# Patient Record
Sex: FEMALE | Race: WHITE | NOT HISPANIC OR LATINO
[De-identification: names, ages, dates, MRNs, and addresses within clinical notes are randomized per-mention and may not be internally consistent; named-entity substitution may affect disease eponyms.]

---

## 2017-01-05 ENCOUNTER — APPOINTMENT (OUTPATIENT)
Dept: OPHTHALMOLOGY | Facility: CLINIC | Age: 37
End: 2017-01-05

## 2017-01-09 ENCOUNTER — APPOINTMENT (OUTPATIENT)
Dept: OPHTHALMOLOGY | Facility: HOSPITAL | Age: 37
End: 2017-01-09

## 2017-01-09 ENCOUNTER — APPOINTMENT (OUTPATIENT)
Dept: PLASTIC SURGERY | Facility: HOSPITAL | Age: 37
End: 2017-01-09

## 2017-01-09 NOTE — ASU DISCHARGE PLAN (ADULT/PEDIATRIC). - SPECIAL INSTRUCTIONS
Use ofloxacin and prednisolone eye drops 3 times today.  Please call [phone] if you have any questions or if you have pain despite taking Tylenol (after 4:30 PM-9am).  Please call [phone] or [phone] during the day (9am-4:30PM) if you have any questions or concerns or pain despite taking Tylenol. Please Use ofloxacin and prednisolone eye drops 3 times today.  Please call [phone] if you have any questions or if you have pain despite taking Tylenol (after 4:30 PM-9am).  Please call [phone] or [phone] during the day (9am-4:30PM) if you have any questions or concerns or pain despite taking Tylenol.

## 2017-01-09 NOTE — ASU DISCHARGE PLAN (ADULT/PEDIATRIC). - NOTIFY
Bleeding that does not stop/Persistent Nausea and Vomiting/Fever greater than 101/Swelling that continues/Pain not relieved by Medications

## 2017-01-09 NOTE — ASU DISCHARGE PLAN (ADULT/PEDIATRIC). - PT EDUC
Implant card (specify)/other (specify)/Intraocular lens implant (IOL) , Eye shield instructions and eye kit given to patient

## 2017-01-10 ENCOUNTER — APPOINTMENT (OUTPATIENT)
Dept: OPHTHALMOLOGY | Facility: CLINIC | Age: 37
End: 2017-01-10

## 2017-01-17 ENCOUNTER — APPOINTMENT (OUTPATIENT)
Dept: OPHTHALMOLOGY | Facility: CLINIC | Age: 37
End: 2017-01-17

## 2017-01-17 ENCOUNTER — APPOINTMENT (OUTPATIENT)
Dept: PLASTIC SURGERY | Facility: HOSPITAL | Age: 37
End: 2017-01-17

## 2017-01-17 ENCOUNTER — OUTPATIENT (OUTPATIENT)
Dept: OUTPATIENT SERVICES | Facility: HOSPITAL | Age: 37
LOS: 1 days | End: 2017-01-17

## 2017-01-17 VITALS
HEART RATE: 67 BPM | BODY MASS INDEX: 24.18 KG/M2 | WEIGHT: 138.19 LBS | DIASTOLIC BLOOD PRESSURE: 64 MMHG | SYSTOLIC BLOOD PRESSURE: 106 MMHG | HEIGHT: 63.5 IN

## 2017-01-17 DIAGNOSIS — Z98.89 OTHER SPECIFIED POSTPROCEDURAL STATES: Chronic | ICD-10-CM

## 2017-01-17 DIAGNOSIS — N63 UNSPECIFIED LUMP IN BREAST: Chronic | ICD-10-CM

## 2017-01-17 DIAGNOSIS — Z87.39 PERSONAL HISTORY OF OTHER DISEASES OF THE MUSCULOSKELETAL SYSTEM AND CONNECTIVE TISSUE: Chronic | ICD-10-CM

## 2017-01-17 DIAGNOSIS — Z90.49 ACQUIRED ABSENCE OF OTHER SPECIFIED PARTS OF DIGESTIVE TRACT: Chronic | ICD-10-CM

## 2017-01-17 DIAGNOSIS — R92.1 MAMMOGRAPHIC CALCIFICATION FOUND ON DIAGNOSTIC IMAGING OF BREAST: Chronic | ICD-10-CM

## 2017-01-18 DIAGNOSIS — N64.89 OTHER SPECIFIED DISORDERS OF BREAST: ICD-10-CM

## 2017-01-18 DIAGNOSIS — Z98.890 OTHER SPECIFIED POSTPROCEDURAL STATES: ICD-10-CM

## 2017-01-19 RX ORDER — KETOROLAC TROMETHAMINE 4 MG/ML
0.4 SOLUTION/ DROPS OPHTHALMIC
Qty: 1 | Refills: 5 | Status: ACTIVE | COMMUNITY
Start: 2017-01-19 | End: 1900-01-01

## 2017-01-24 ENCOUNTER — RX RENEWAL (OUTPATIENT)
Age: 37
End: 2017-01-24

## 2017-01-24 ENCOUNTER — APPOINTMENT (OUTPATIENT)
Dept: OPHTHALMOLOGY | Facility: CLINIC | Age: 37
End: 2017-01-24

## 2017-01-27 ENCOUNTER — APPOINTMENT (OUTPATIENT)
Dept: OPHTHALMOLOGY | Facility: CLINIC | Age: 37
End: 2017-01-27

## 2017-01-27 NOTE — ASU PATIENT PROFILE, ADULT - PSH
Breast calcification, right  breast biopsy  Breast lump in female  left breast lumpectomy  craniotomy  3/2006 placement of EEG leads  craniotomy- removal right temporal lobe  4/2006  gastric bypass  2011 lost 100 pounds  H/O bilateral mastectomy    History of TMJ disorder  Arthroscopic surgery right side  S/P breast reconstruction, bilateral    S/P cholecystectomy  3/2014  Status post cataract extraction  left eye done on 12/19/2016  Status post VNS (vagus nerve stimulator) placement  implanted, and removed 2006

## 2017-01-27 NOTE — ASU PATIENT PROFILE, ADULT - PMH
Arthralgia of temporomandibular joint    Benign breast cyst in female    Bradycardia  pt currently taking propranolol for chronic headaches  Cervical dysplasia  s/p ablation  Chronic headaches  treated with propanolol  EP (epilepsy)  last seizure in 2012  Obesity, morbid

## 2017-01-30 ENCOUNTER — OUTPATIENT (OUTPATIENT)
Dept: OUTPATIENT SERVICES | Facility: HOSPITAL | Age: 37
LOS: 1 days | End: 2017-01-30
Payer: MEDICARE

## 2017-01-30 ENCOUNTER — APPOINTMENT (OUTPATIENT)
Dept: OPHTHALMOLOGY | Facility: HOSPITAL | Age: 37
End: 2017-01-30

## 2017-01-30 ENCOUNTER — TRANSCRIPTION ENCOUNTER (OUTPATIENT)
Age: 37
End: 2017-01-30

## 2017-01-30 VITALS
SYSTOLIC BLOOD PRESSURE: 101 MMHG | HEART RATE: 42 BPM | OXYGEN SATURATION: 100 % | DIASTOLIC BLOOD PRESSURE: 53 MMHG | RESPIRATION RATE: 20 BRPM

## 2017-01-30 VITALS
WEIGHT: 135.58 LBS | SYSTOLIC BLOOD PRESSURE: 101 MMHG | RESPIRATION RATE: 16 BRPM | HEART RATE: 45 BPM | HEIGHT: 63.5 IN | TEMPERATURE: 98 F | OXYGEN SATURATION: 99 % | DIASTOLIC BLOOD PRESSURE: 61 MMHG

## 2017-01-30 DIAGNOSIS — Z90.49 ACQUIRED ABSENCE OF OTHER SPECIFIED PARTS OF DIGESTIVE TRACT: Chronic | ICD-10-CM

## 2017-01-30 DIAGNOSIS — R92.1 MAMMOGRAPHIC CALCIFICATION FOUND ON DIAGNOSTIC IMAGING OF BREAST: Chronic | ICD-10-CM

## 2017-01-30 DIAGNOSIS — Z98.49 CATARACT EXTRACTION STATUS, UNSPECIFIED EYE: Chronic | ICD-10-CM

## 2017-01-30 DIAGNOSIS — H25.811 COMBINED FORMS OF AGE-RELATED CATARACT, RIGHT EYE: ICD-10-CM

## 2017-01-30 DIAGNOSIS — N63 UNSPECIFIED LUMP IN BREAST: Chronic | ICD-10-CM

## 2017-01-30 DIAGNOSIS — Z98.89 OTHER SPECIFIED POSTPROCEDURAL STATES: Chronic | ICD-10-CM

## 2017-01-30 DIAGNOSIS — Z87.39 PERSONAL HISTORY OF OTHER DISEASES OF THE MUSCULOSKELETAL SYSTEM AND CONNECTIVE TISSUE: Chronic | ICD-10-CM

## 2017-01-30 LAB — HCG UR QL: NEGATIVE — SIGNIFICANT CHANGE UP

## 2017-01-30 PROCEDURE — V2787: CPT

## 2017-01-30 PROCEDURE — 81025 URINE PREGNANCY TEST: CPT

## 2017-01-30 PROCEDURE — 66984 XCAPSL CTRC RMVL W/O ECP: CPT | Mod: RT

## 2017-01-31 ENCOUNTER — APPOINTMENT (OUTPATIENT)
Dept: OPHTHALMOLOGY | Facility: CLINIC | Age: 37
End: 2017-01-31

## 2017-02-07 ENCOUNTER — APPOINTMENT (OUTPATIENT)
Dept: OPHTHALMOLOGY | Facility: CLINIC | Age: 37
End: 2017-02-07

## 2017-02-13 ENCOUNTER — OUTPATIENT (OUTPATIENT)
Dept: OUTPATIENT SERVICES | Facility: HOSPITAL | Age: 37
LOS: 1 days | End: 2017-02-13

## 2017-02-13 ENCOUNTER — APPOINTMENT (OUTPATIENT)
Dept: PLASTIC SURGERY | Facility: HOSPITAL | Age: 37
End: 2017-02-13

## 2017-02-13 VITALS — SYSTOLIC BLOOD PRESSURE: 110 MMHG | HEART RATE: 78 BPM | DIASTOLIC BLOOD PRESSURE: 75 MMHG

## 2017-02-13 VITALS — WEIGHT: 138 LBS | HEIGHT: 63.5 IN | BODY MASS INDEX: 24.15 KG/M2

## 2017-02-13 DIAGNOSIS — Z86.69 PERSONAL HISTORY OF OTHER DISEASES OF THE NERVOUS SYSTEM AND SENSE ORGANS: ICD-10-CM

## 2017-02-13 DIAGNOSIS — R92.1 MAMMOGRAPHIC CALCIFICATION FOUND ON DIAGNOSTIC IMAGING OF BREAST: Chronic | ICD-10-CM

## 2017-02-13 DIAGNOSIS — Z87.39 PERSONAL HISTORY OF OTHER DISEASES OF THE MUSCULOSKELETAL SYSTEM AND CONNECTIVE TISSUE: Chronic | ICD-10-CM

## 2017-02-13 DIAGNOSIS — Z90.49 ACQUIRED ABSENCE OF OTHER SPECIFIED PARTS OF DIGESTIVE TRACT: Chronic | ICD-10-CM

## 2017-02-13 DIAGNOSIS — N63 UNSPECIFIED LUMP IN BREAST: Chronic | ICD-10-CM

## 2017-02-13 DIAGNOSIS — Z98.89 OTHER SPECIFIED POSTPROCEDURAL STATES: Chronic | ICD-10-CM

## 2017-02-13 DIAGNOSIS — Z98.49 CATARACT EXTRACTION STATUS, UNSPECIFIED EYE: Chronic | ICD-10-CM

## 2017-02-17 DIAGNOSIS — Z86.69 PERSONAL HISTORY OF OTHER DISEASES OF THE NERVOUS SYSTEM AND SENSE ORGANS: ICD-10-CM

## 2017-02-17 DIAGNOSIS — N64.89 OTHER SPECIFIED DISORDERS OF BREAST: ICD-10-CM

## 2017-02-21 ENCOUNTER — APPOINTMENT (OUTPATIENT)
Dept: OPHTHALMOLOGY | Facility: CLINIC | Age: 37
End: 2017-02-21

## 2017-02-21 DIAGNOSIS — Z98.890 OTHER SPECIFIED POSTPROCEDURAL STATES: ICD-10-CM

## 2017-02-21 DIAGNOSIS — Z98.42 CATARACT EXTRACTION STATUS, LEFT EYE: ICD-10-CM

## 2017-03-02 ENCOUNTER — APPOINTMENT (OUTPATIENT)
Dept: OPHTHALMOLOGY | Facility: CLINIC | Age: 37
End: 2017-03-02

## 2017-03-02 DIAGNOSIS — H25.13 AGE-RELATED NUCLEAR CATARACT, BILATERAL: ICD-10-CM

## 2017-03-13 ENCOUNTER — APPOINTMENT (OUTPATIENT)
Dept: PLASTIC SURGERY | Facility: HOSPITAL | Age: 37
End: 2017-03-13

## 2017-03-13 ENCOUNTER — OUTPATIENT (OUTPATIENT)
Dept: OUTPATIENT SERVICES | Facility: HOSPITAL | Age: 37
LOS: 1 days | End: 2017-03-13

## 2017-03-13 VITALS
BODY MASS INDEX: 24.5 KG/M2 | WEIGHT: 140 LBS | HEIGHT: 63.5 IN | DIASTOLIC BLOOD PRESSURE: 68 MMHG | SYSTOLIC BLOOD PRESSURE: 110 MMHG | HEART RATE: 62 BPM

## 2017-03-13 DIAGNOSIS — Z98.89 OTHER SPECIFIED POSTPROCEDURAL STATES: Chronic | ICD-10-CM

## 2017-03-13 DIAGNOSIS — Z87.39 PERSONAL HISTORY OF OTHER DISEASES OF THE MUSCULOSKELETAL SYSTEM AND CONNECTIVE TISSUE: Chronic | ICD-10-CM

## 2017-03-13 DIAGNOSIS — Z90.49 ACQUIRED ABSENCE OF OTHER SPECIFIED PARTS OF DIGESTIVE TRACT: Chronic | ICD-10-CM

## 2017-03-13 DIAGNOSIS — Z98.49 CATARACT EXTRACTION STATUS, UNSPECIFIED EYE: Chronic | ICD-10-CM

## 2017-03-13 DIAGNOSIS — R92.1 MAMMOGRAPHIC CALCIFICATION FOUND ON DIAGNOSTIC IMAGING OF BREAST: Chronic | ICD-10-CM

## 2017-03-13 DIAGNOSIS — N63 UNSPECIFIED LUMP IN BREAST: Chronic | ICD-10-CM

## 2017-03-15 ENCOUNTER — RESULT REVIEW (OUTPATIENT)
Age: 37
End: 2017-03-15

## 2017-03-15 ENCOUNTER — OUTPATIENT (OUTPATIENT)
Dept: OUTPATIENT SERVICES | Facility: HOSPITAL | Age: 37
LOS: 1 days | End: 2017-03-15
Payer: MEDICARE

## 2017-03-15 VITALS
HEART RATE: 48 BPM | HEIGHT: 63.5 IN | SYSTOLIC BLOOD PRESSURE: 116 MMHG | RESPIRATION RATE: 14 BRPM | DIASTOLIC BLOOD PRESSURE: 68 MMHG | TEMPERATURE: 98 F | WEIGHT: 138.01 LBS

## 2017-03-15 DIAGNOSIS — N64.9 DISORDER OF BREAST, UNSPECIFIED: ICD-10-CM

## 2017-03-15 DIAGNOSIS — H26.9 UNSPECIFIED CATARACT: Chronic | ICD-10-CM

## 2017-03-15 DIAGNOSIS — Z87.39 PERSONAL HISTORY OF OTHER DISEASES OF THE MUSCULOSKELETAL SYSTEM AND CONNECTIVE TISSUE: Chronic | ICD-10-CM

## 2017-03-15 DIAGNOSIS — N63 UNSPECIFIED LUMP IN BREAST: Chronic | ICD-10-CM

## 2017-03-15 DIAGNOSIS — Z90.49 ACQUIRED ABSENCE OF OTHER SPECIFIED PARTS OF DIGESTIVE TRACT: Chronic | ICD-10-CM

## 2017-03-15 DIAGNOSIS — R00.1 BRADYCARDIA, UNSPECIFIED: ICD-10-CM

## 2017-03-15 DIAGNOSIS — Z98.89 OTHER SPECIFIED POSTPROCEDURAL STATES: Chronic | ICD-10-CM

## 2017-03-15 DIAGNOSIS — R92.1 MAMMOGRAPHIC CALCIFICATION FOUND ON DIAGNOSTIC IMAGING OF BREAST: Chronic | ICD-10-CM

## 2017-03-15 DIAGNOSIS — Z98.49 CATARACT EXTRACTION STATUS, UNSPECIFIED EYE: Chronic | ICD-10-CM

## 2017-03-15 DIAGNOSIS — Z42.1 ENCOUNTER FOR BREAST RECONSTRUCTION FOLLOWING MASTECTOMY: ICD-10-CM

## 2017-03-15 DIAGNOSIS — Z98.82 BREAST IMPLANT STATUS: ICD-10-CM

## 2017-03-15 LAB
BUN SERPL-MCNC: 10 MG/DL — SIGNIFICANT CHANGE UP (ref 7–23)
CALCIUM SERPL-MCNC: 9.1 MG/DL — SIGNIFICANT CHANGE UP (ref 8.4–10.5)
CHLORIDE SERPL-SCNC: 105 MMOL/L — SIGNIFICANT CHANGE UP (ref 98–107)
CO2 SERPL-SCNC: 28 MMOL/L — SIGNIFICANT CHANGE UP (ref 22–31)
CREAT SERPL-MCNC: 0.76 MG/DL — SIGNIFICANT CHANGE UP (ref 0.5–1.3)
GLUCOSE SERPL-MCNC: 85 MG/DL — SIGNIFICANT CHANGE UP (ref 70–99)
HCT VFR BLD CALC: 34.8 % — SIGNIFICANT CHANGE UP (ref 34.5–45)
HGB BLD-MCNC: 10.3 G/DL — LOW (ref 11.5–15.5)
MCHC RBC-ENTMCNC: 25.2 PG — LOW (ref 27–34)
MCHC RBC-ENTMCNC: 29.6 % — LOW (ref 32–36)
MCV RBC AUTO: 85.1 FL — SIGNIFICANT CHANGE UP (ref 80–100)
PLATELET # BLD AUTO: 392 K/UL — SIGNIFICANT CHANGE UP (ref 150–400)
PMV BLD: 10.7 FL — SIGNIFICANT CHANGE UP (ref 7–13)
POTASSIUM SERPL-MCNC: 4.2 MMOL/L — SIGNIFICANT CHANGE UP (ref 3.5–5.3)
POTASSIUM SERPL-SCNC: 4.2 MMOL/L — SIGNIFICANT CHANGE UP (ref 3.5–5.3)
RBC # BLD: 4.09 M/UL — SIGNIFICANT CHANGE UP (ref 3.8–5.2)
RBC # FLD: 15.5 % — HIGH (ref 10.3–14.5)
SODIUM SERPL-SCNC: 144 MMOL/L — SIGNIFICANT CHANGE UP (ref 135–145)
WBC # BLD: 7.36 K/UL — SIGNIFICANT CHANGE UP (ref 3.8–10.5)
WBC # FLD AUTO: 7.36 K/UL — SIGNIFICANT CHANGE UP (ref 3.8–10.5)

## 2017-03-15 PROCEDURE — 93010 ELECTROCARDIOGRAM REPORT: CPT

## 2017-03-15 NOTE — H&P PST ADULT - NEGATIVE ENMT SYMPTOMS
no vertigo/no ear pain/no nose bleeds/no dysphagia/no nasal congestion/no sinus symptoms/no hearing difficulty/no nasal discharge/no tinnitus

## 2017-03-15 NOTE — H&P PST ADULT - HISTORY OF PRESENT ILLNESS
35 y/o female PMH TMJ s/p right temporomandibular arthroscopy, Hx of epilepsy s/p craniotomy removal of right temporal lobectomy in 2006 , last seizure 2012,s/p Gastric bypass 2011, s/p breast reduction at age 16 and multiple breast surgeries for removal of benign cysts in the past. Presents now for bilateral prophylactic mastectomies, b/l axillary sentinel lymph node biopsies, b/l breast reconstruction with insertion of tissue expanders. 35y/o female PMH TMJ s/p right temporomandibular arthroscopy, Hx of epilepsy s/p craniotomy removal of right temporal lobectomy in 2006 , last seizure 2012,s/p Gastric bypass 2011, s/p breast reduction at age 16 and multiple breast surgeries for removal of benign cysts in the past.  H/o bilateral prophylactic mastectomies, b/l axillary sentinel lymph node biopsies, b/l breast reconstruction with insertion of tissue expanders. 35y/o female PMH TMJ s/p right temporomandibular arthroscopy, Hx of epilepsy s/p craniotomy removal of right temporal lobectomy in 2006 , last seizure 2012,s/p Gastric bypass 2011, s/p breast reduction at age 16 and multiple breast surgeries for removal of benign cysts in the past.  H/o bilateral prophylactic mastectomies, b/l axillary sentinel lymph node biopsies, b/l breast reconstruction with insertion of tissue expanders.  H/o silicone implants b/l.   Pt presents to PST with c/o right breast drop and intermittent pain.   Now scheduled for b/l revision breast reconstruction capsulorrhaphies implant exchange implantable mesh possible fat grafting on 3/16/17

## 2017-03-15 NOTE — H&P PST ADULT - PROBLEM SELECTOR PLAN 1
scheduled for b/l revision breast reconstruction capsulorrhaphies implant exchange implantable mesh possible fat grafting on 3/16/17  preop instructions, gi prophylaxis & surgical soap given   pt verbalized understanding

## 2017-03-15 NOTE — H&P PST ADULT - VISION (WITH CORRECTIVE LENSES IF THE PATIENT USUALLY WEARS THEM):
Normal vision: sees adequately in most situations; can see medication labels, newsprint/glasses Partially impaired: cannot see medication labels or newsprint, but can see obstacles in path, and the surrounding layout; can count fingers at arm's length/glasses

## 2017-03-15 NOTE — H&P PST ADULT - NEGATIVE NEUROLOGICAL SYMPTOMS
no facial palsy/no confusion/no paresthesias/no loss of sensation/no difficulty walking/no hemiparesis/no syncope/no transient paralysis/no tremors/no vertigo/no weakness

## 2017-03-15 NOTE — H&P PST ADULT - PRO TOBACCO TYPE
cigarettes/socially 1 pack / week currently socially 1 pack / week currently, Quit 1/2017/cigarettes

## 2017-03-15 NOTE — H&P PST ADULT - PSH
Breast calcification, right  breast biopsy  Breast lump in female  left breast lumpectomy  craniotomy  3/2006 placement of EEG leads  craniotomy- removal right temporal lobe  4/2006  gastric bypass  2011 lost 100 pounds  History of TMJ disorder  Arthroscopic surgery right side  S/P cholecystectomy  3/2014  Status post VNS (vagus nerve stimulator) placement  implanted, and removed 2006 Breast calcification, right  breast biopsy  Breast lump in female  left breast lumpectomy  Cataract  b/l  left 12/19/2016 & right 1/30/2017 with b/l IOL implanted  craniotomy  3/2006 placement of EEG leads  craniotomy- removal right temporal lobe  4/2006  gastric bypass  2011 lost 100 pounds  H/O bilateral mastectomy    History of TMJ disorder  Arthroscopic surgery right side  S/P breast reconstruction, bilateral    S/P cholecystectomy  3/2014  Status post VNS (vagus nerve stimulator) placement  implanted, and removed 2006

## 2017-03-15 NOTE — H&P PST ADULT - LYMPHATIC
posterior cervical L supraclavicular L/supraclavicular R/anterior cervical L/posterior cervical R/posterior cervical L/anterior cervical R

## 2017-03-15 NOTE — H&P PST ADULT - NEGATIVE BREAST SYMPTOMS
no breast tenderness L/no breast tenderness R/no nipple discharge L/doesn't perform monthly breast exam/no nipple discharge R

## 2017-03-15 NOTE — H&P PST ADULT - PMH
Arthralgia of temporomandibular joint    Benign breast cyst in female    Bradycardia  pt currently taking propranolol for chronic headaches  Cervical dysplasia  s/p ablation  Chronic headaches  treated with propanolol  EP (epilepsy)  last seizure in 2012  Obesity, morbid Arthralgia of temporomandibular joint    Benign breast cyst in female    Bradycardia  pt currently taking propranolol for chronic headaches  Cervical dysplasia  s/p ablation  Chronic headaches  treated with propanolol  EP (epilepsy)  last seizure in 2012

## 2017-03-15 NOTE — H&P PST ADULT - FAMILY HISTORY
Mother  Still living? Unknown  Family history of seizures, Age at diagnosis: Age Unknown     Aunt  Still living? Unknown  Family history of seizures, Age at diagnosis: Age Unknown Mother  Still living? Unknown  Family history of seizures, Age at diagnosis: Age Unknown     Aunt  Still living? Unknown  Family history of seizures, Age at diagnosis: Age Unknown     Father  Still living? Yes, Estimated age: Age Unknown  Family history of hypertension, Age at diagnosis: Age Unknown  Family history of stroke, Age at diagnosis: Age Unknown  Family history of Parkinson's disease, Age at diagnosis: Age Unknown  Family history of diabetes mellitus, Age at diagnosis: Age Unknown  Family history of hypercholesterolemia, Age at diagnosis: Age Unknown

## 2017-03-15 NOTE — H&P PST ADULT - ANESTHESIA, PREVIOUS REACTION, PROFILE
h/o nausea and vomiting 2006 and later anesthesia no problems h/o nausea and vomiting 2006 and later anesthesia no problems, denies family hx

## 2017-03-15 NOTE — H&P PST ADULT - NS MD HP INPLANTS MED DEV
right craniotomy with  titanium plate and screws, IUD right craniotomy with  titanium plate and screws, IUD, IOL b/l,  silicone/Breast implant

## 2017-03-15 NOTE — H&P PST ADULT - PROBLEM SELECTOR PLAN 2
h/o chronic sinus bradycardia secondary to propanolol use for chronic migraine headaches.  Pt denies symptomatology.   case discussed with Dr Stevens

## 2017-03-16 ENCOUNTER — OUTPATIENT (OUTPATIENT)
Dept: OUTPATIENT SERVICES | Facility: HOSPITAL | Age: 37
LOS: 1 days | Discharge: HOME | End: 2017-03-16
Payer: MEDICARE

## 2017-03-16 VITALS
OXYGEN SATURATION: 100 % | DIASTOLIC BLOOD PRESSURE: 64 MMHG | SYSTOLIC BLOOD PRESSURE: 117 MMHG | TEMPERATURE: 98 F | HEIGHT: 63.5 IN | RESPIRATION RATE: 14 BRPM | HEART RATE: 69 BPM | WEIGHT: 138.01 LBS

## 2017-03-16 VITALS
SYSTOLIC BLOOD PRESSURE: 110 MMHG | DIASTOLIC BLOOD PRESSURE: 60 MMHG | TEMPERATURE: 98 F | HEART RATE: 69 BPM | RESPIRATION RATE: 12 BRPM | OXYGEN SATURATION: 100 %

## 2017-03-16 DIAGNOSIS — N63 UNSPECIFIED LUMP IN BREAST: Chronic | ICD-10-CM

## 2017-03-16 DIAGNOSIS — Z42.1 ENCOUNTER FOR BREAST RECONSTRUCTION FOLLOWING MASTECTOMY: ICD-10-CM

## 2017-03-16 DIAGNOSIS — Z87.39 PERSONAL HISTORY OF OTHER DISEASES OF THE MUSCULOSKELETAL SYSTEM AND CONNECTIVE TISSUE: Chronic | ICD-10-CM

## 2017-03-16 DIAGNOSIS — H26.9 UNSPECIFIED CATARACT: Chronic | ICD-10-CM

## 2017-03-16 DIAGNOSIS — Z98.89 OTHER SPECIFIED POSTPROCEDURAL STATES: Chronic | ICD-10-CM

## 2017-03-16 DIAGNOSIS — Z90.49 ACQUIRED ABSENCE OF OTHER SPECIFIED PARTS OF DIGESTIVE TRACT: Chronic | ICD-10-CM

## 2017-03-16 DIAGNOSIS — R92.1 MAMMOGRAPHIC CALCIFICATION FOUND ON DIAGNOSTIC IMAGING OF BREAST: Chronic | ICD-10-CM

## 2017-03-16 PROCEDURE — 88300 SURGICAL PATH GROSS: CPT | Mod: 26

## 2017-03-16 RX ORDER — OXYCODONE HYDROCHLORIDE 5 MG/1
1 TABLET ORAL
Qty: 30 | Refills: 0 | OUTPATIENT
Start: 2017-03-16

## 2017-03-16 NOTE — ASU DISCHARGE PLAN (ADULT/PEDIATRIC). - SPECIAL INSTRUCTIONS
Take over the counter stool softener to prevent constipation from pain meds   Increase fluids and fiber in diet  Do not take Tylenol with percocet as there is Tylenol in percocet   Keep bra on at all times Take over the counter stool softener to prevent constipation from pain meds   Increase fluids and fiber in diet  Do not take Tylenol with percocet as there is Tylenol in percocet   Keep bra on at all times  DO NOT take any Tylenol (Acetaminophen) or narcotics containing Tylenol until after  9 PM. You received Tylenol during your operation and it can cause damage to your liver if too much is taken within a 24 hour time period.

## 2017-03-16 NOTE — ASU DISCHARGE PLAN (ADULT/PEDIATRIC). - ITEMS TO FOLLOWUP WITH YOUR PHYSICIAN'S
Leave all dressings in place. Empty and record GEE drain daily. Follow-up Tuesday. Limit activities.

## 2017-03-16 NOTE — ASU DISCHARGE PLAN (ADULT/PEDIATRIC). - NOTIFY
Fever greater than 101/Swelling that continues/Pain not relieved by Medications/Bleeding that does not stop Inability to Tolerate Liquids or Foods/Fever greater than 101/Unable to Urinate/Persistent Nausea and Vomiting/Swelling that continues/Bleeding that does not stop/Pain not relieved by Medications

## 2017-03-16 NOTE — ASU PREOPERATIVE ASSESSMENT, ADULT ([facility]ARK ONLY) - PROCEDURE
bilateral revision breast reconstruction capsulorrhaphies implant exchange, possible fat grafting right revision breast reconstruction capsulorrhaphies implant exchange, possible fat grafting

## 2017-03-17 ENCOUNTER — TRANSCRIPTION ENCOUNTER (OUTPATIENT)
Age: 37
End: 2017-03-17

## 2017-03-21 ENCOUNTER — OUTPATIENT (OUTPATIENT)
Dept: OUTPATIENT SERVICES | Facility: HOSPITAL | Age: 37
LOS: 1 days | End: 2017-03-21

## 2017-03-21 ENCOUNTER — APPOINTMENT (OUTPATIENT)
Dept: PLASTIC SURGERY | Facility: HOSPITAL | Age: 37
End: 2017-03-21

## 2017-03-21 VITALS
WEIGHT: 139.11 LBS | HEART RATE: 75 BPM | BODY MASS INDEX: 24.34 KG/M2 | DIASTOLIC BLOOD PRESSURE: 63 MMHG | SYSTOLIC BLOOD PRESSURE: 97 MMHG | HEIGHT: 63.5 IN

## 2017-03-21 DIAGNOSIS — H26.9 UNSPECIFIED CATARACT: Chronic | ICD-10-CM

## 2017-03-21 DIAGNOSIS — Z87.39 PERSONAL HISTORY OF OTHER DISEASES OF THE MUSCULOSKELETAL SYSTEM AND CONNECTIVE TISSUE: Chronic | ICD-10-CM

## 2017-03-21 DIAGNOSIS — Z98.890 OTHER SPECIFIED POSTPROCEDURAL STATES: ICD-10-CM

## 2017-03-21 DIAGNOSIS — Z90.49 ACQUIRED ABSENCE OF OTHER SPECIFIED PARTS OF DIGESTIVE TRACT: Chronic | ICD-10-CM

## 2017-03-21 DIAGNOSIS — R92.1 MAMMOGRAPHIC CALCIFICATION FOUND ON DIAGNOSTIC IMAGING OF BREAST: Chronic | ICD-10-CM

## 2017-03-21 DIAGNOSIS — Z98.89 OTHER SPECIFIED POSTPROCEDURAL STATES: Chronic | ICD-10-CM

## 2017-03-21 DIAGNOSIS — N63 UNSPECIFIED LUMP IN BREAST: Chronic | ICD-10-CM

## 2017-03-27 ENCOUNTER — APPOINTMENT (OUTPATIENT)
Dept: PLASTIC SURGERY | Facility: HOSPITAL | Age: 37
End: 2017-03-27

## 2017-03-27 ENCOUNTER — OUTPATIENT (OUTPATIENT)
Dept: OUTPATIENT SERVICES | Facility: HOSPITAL | Age: 37
LOS: 1 days | End: 2017-03-27

## 2017-03-27 VITALS
DIASTOLIC BLOOD PRESSURE: 83 MMHG | BODY MASS INDEX: 25.25 KG/M2 | HEART RATE: 72 BPM | WEIGHT: 144.31 LBS | SYSTOLIC BLOOD PRESSURE: 127 MMHG | HEIGHT: 63.5 IN

## 2017-03-27 DIAGNOSIS — Z90.49 ACQUIRED ABSENCE OF OTHER SPECIFIED PARTS OF DIGESTIVE TRACT: Chronic | ICD-10-CM

## 2017-03-27 DIAGNOSIS — N63 UNSPECIFIED LUMP IN BREAST: Chronic | ICD-10-CM

## 2017-03-27 DIAGNOSIS — Z98.89 OTHER SPECIFIED POSTPROCEDURAL STATES: Chronic | ICD-10-CM

## 2017-03-27 DIAGNOSIS — Z87.39 PERSONAL HISTORY OF OTHER DISEASES OF THE MUSCULOSKELETAL SYSTEM AND CONNECTIVE TISSUE: Chronic | ICD-10-CM

## 2017-03-27 DIAGNOSIS — R92.1 MAMMOGRAPHIC CALCIFICATION FOUND ON DIAGNOSTIC IMAGING OF BREAST: Chronic | ICD-10-CM

## 2017-03-27 DIAGNOSIS — H26.9 UNSPECIFIED CATARACT: Chronic | ICD-10-CM

## 2017-03-29 DIAGNOSIS — N64.89 OTHER SPECIFIED DISORDERS OF BREAST: ICD-10-CM

## 2017-03-29 DIAGNOSIS — Z98.82 BREAST IMPLANT STATUS: ICD-10-CM

## 2017-03-29 LAB — SURGICAL PATHOLOGY STUDY: SIGNIFICANT CHANGE UP

## 2017-03-31 DIAGNOSIS — N64.89 OTHER SPECIFIED DISORDERS OF BREAST: ICD-10-CM

## 2017-03-31 DIAGNOSIS — Z98.890 OTHER SPECIFIED POSTPROCEDURAL STATES: ICD-10-CM

## 2017-04-24 ENCOUNTER — OUTPATIENT (OUTPATIENT)
Dept: OUTPATIENT SERVICES | Facility: HOSPITAL | Age: 37
LOS: 1 days | End: 2017-04-24

## 2017-04-24 ENCOUNTER — APPOINTMENT (OUTPATIENT)
Dept: PLASTIC SURGERY | Facility: HOSPITAL | Age: 37
End: 2017-04-24

## 2017-04-24 VITALS
HEIGHT: 63 IN | DIASTOLIC BLOOD PRESSURE: 70 MMHG | BODY MASS INDEX: 24.98 KG/M2 | WEIGHT: 141 LBS | SYSTOLIC BLOOD PRESSURE: 101 MMHG | HEART RATE: 62 BPM

## 2017-04-24 DIAGNOSIS — R92.1 MAMMOGRAPHIC CALCIFICATION FOUND ON DIAGNOSTIC IMAGING OF BREAST: Chronic | ICD-10-CM

## 2017-04-24 DIAGNOSIS — Z87.39 PERSONAL HISTORY OF OTHER DISEASES OF THE MUSCULOSKELETAL SYSTEM AND CONNECTIVE TISSUE: Chronic | ICD-10-CM

## 2017-04-24 DIAGNOSIS — Z98.89 OTHER SPECIFIED POSTPROCEDURAL STATES: Chronic | ICD-10-CM

## 2017-04-24 DIAGNOSIS — H26.9 UNSPECIFIED CATARACT: Chronic | ICD-10-CM

## 2017-04-24 DIAGNOSIS — N63 UNSPECIFIED LUMP IN BREAST: Chronic | ICD-10-CM

## 2017-04-24 DIAGNOSIS — Z90.49 ACQUIRED ABSENCE OF OTHER SPECIFIED PARTS OF DIGESTIVE TRACT: Chronic | ICD-10-CM

## 2017-04-25 DIAGNOSIS — Z98.82 BREAST IMPLANT STATUS: ICD-10-CM

## 2017-06-06 ENCOUNTER — APPOINTMENT (OUTPATIENT)
Dept: OPHTHALMOLOGY | Facility: CLINIC | Age: 37
End: 2017-06-06

## 2017-06-19 ENCOUNTER — APPOINTMENT (OUTPATIENT)
Dept: OPHTHALMOLOGY | Facility: CLINIC | Age: 37
End: 2017-06-19

## 2017-06-19 DIAGNOSIS — G40.909 EPILEPSY, UNSPECIFIED, NOT INTRACTABLE, W/OUT STATUS EPILEPTICUS: ICD-10-CM

## 2017-06-29 ENCOUNTER — APPOINTMENT (OUTPATIENT)
Dept: SURGICAL ONCOLOGY | Facility: CLINIC | Age: 37
End: 2017-06-29

## 2017-06-29 VITALS — OXYGEN SATURATION: 100 % | RESPIRATION RATE: 16 BRPM

## 2017-06-29 VITALS — BODY MASS INDEX: 25.27 KG/M2 | TEMPERATURE: 98.1 F | WEIGHT: 148 LBS | HEIGHT: 64 IN

## 2017-06-29 DIAGNOSIS — Z86.69 PERSONAL HISTORY OF OTHER DISEASES OF THE NERVOUS SYSTEM AND SENSE ORGANS: ICD-10-CM

## 2017-06-29 DIAGNOSIS — Z56.0 UNEMPLOYMENT, UNSPECIFIED: ICD-10-CM

## 2017-06-29 DIAGNOSIS — Z87.891 PERSONAL HISTORY OF NICOTINE DEPENDENCE: ICD-10-CM

## 2017-06-29 DIAGNOSIS — Z87.2 PERSONAL HISTORY OF DISEASES OF THE SKIN AND SUBCUTANEOUS TISSUE: ICD-10-CM

## 2017-06-29 DIAGNOSIS — Z87.09 PERSONAL HISTORY OF OTHER DISEASES OF THE RESPIRATORY SYSTEM: ICD-10-CM

## 2017-06-29 SDOH — ECONOMIC STABILITY - INCOME SECURITY: UNEMPLOYMENT, UNSPECIFIED: Z56.0

## 2017-07-10 ENCOUNTER — APPOINTMENT (OUTPATIENT)
Dept: NEUROLOGY | Facility: CLINIC | Age: 37
End: 2017-07-10

## 2017-07-10 VITALS
SYSTOLIC BLOOD PRESSURE: 100 MMHG | WEIGHT: 145 LBS | HEART RATE: 64 BPM | DIASTOLIC BLOOD PRESSURE: 67 MMHG | BODY MASS INDEX: 24.75 KG/M2 | HEIGHT: 64 IN

## 2017-07-18 ENCOUNTER — OUTPATIENT (OUTPATIENT)
Dept: OUTPATIENT SERVICES | Facility: HOSPITAL | Age: 37
LOS: 1 days | End: 2017-07-18

## 2017-07-18 ENCOUNTER — APPOINTMENT (OUTPATIENT)
Dept: PLASTIC SURGERY | Facility: HOSPITAL | Age: 37
End: 2017-07-18

## 2017-07-18 VITALS
HEART RATE: 67 BPM | HEIGHT: 64 IN | SYSTOLIC BLOOD PRESSURE: 105 MMHG | DIASTOLIC BLOOD PRESSURE: 62 MMHG | BODY MASS INDEX: 24.21 KG/M2 | WEIGHT: 141.8 LBS

## 2017-07-18 DIAGNOSIS — R92.1 MAMMOGRAPHIC CALCIFICATION FOUND ON DIAGNOSTIC IMAGING OF BREAST: Chronic | ICD-10-CM

## 2017-07-18 DIAGNOSIS — Z90.49 ACQUIRED ABSENCE OF OTHER SPECIFIED PARTS OF DIGESTIVE TRACT: Chronic | ICD-10-CM

## 2017-07-18 DIAGNOSIS — Z87.39 PERSONAL HISTORY OF OTHER DISEASES OF THE MUSCULOSKELETAL SYSTEM AND CONNECTIVE TISSUE: Chronic | ICD-10-CM

## 2017-07-18 DIAGNOSIS — Z98.89 OTHER SPECIFIED POSTPROCEDURAL STATES: Chronic | ICD-10-CM

## 2017-07-18 DIAGNOSIS — N63 UNSPECIFIED LUMP IN BREAST: Chronic | ICD-10-CM

## 2017-07-18 DIAGNOSIS — H26.9 UNSPECIFIED CATARACT: Chronic | ICD-10-CM

## 2017-07-19 ENCOUNTER — APPOINTMENT (OUTPATIENT)
Dept: OPHTHALMOLOGY | Facility: CLINIC | Age: 37
End: 2017-07-19

## 2017-07-19 DIAGNOSIS — Z98.82 BREAST IMPLANT STATUS: ICD-10-CM

## 2017-09-06 ENCOUNTER — APPOINTMENT (OUTPATIENT)
Dept: NEUROLOGY | Facility: CLINIC | Age: 37
End: 2017-09-06

## 2017-10-03 ENCOUNTER — APPOINTMENT (OUTPATIENT)
Dept: NEUROLOGY | Facility: CLINIC | Age: 37
End: 2017-10-03
Payer: MEDICARE

## 2017-10-03 VITALS
BODY MASS INDEX: 23.56 KG/M2 | WEIGHT: 138 LBS | SYSTOLIC BLOOD PRESSURE: 95 MMHG | HEART RATE: 56 BPM | DIASTOLIC BLOOD PRESSURE: 61 MMHG | HEIGHT: 64 IN

## 2017-10-03 PROCEDURE — 99214 OFFICE O/P EST MOD 30 MIN: CPT

## 2017-10-11 ENCOUNTER — MEDICATION RENEWAL (OUTPATIENT)
Age: 37
End: 2017-10-11

## 2017-10-19 ENCOUNTER — APPOINTMENT (OUTPATIENT)
Dept: DERMATOLOGY | Facility: CLINIC | Age: 37
End: 2017-10-19
Payer: MEDICARE

## 2017-10-19 VITALS — DIASTOLIC BLOOD PRESSURE: 60 MMHG | SYSTOLIC BLOOD PRESSURE: 96 MMHG

## 2017-10-19 DIAGNOSIS — Z12.83 ENCOUNTER FOR SCREENING FOR MALIGNANT NEOPLASM OF SKIN: ICD-10-CM

## 2017-10-19 DIAGNOSIS — L81.4 OTHER MELANIN HYPERPIGMENTATION: ICD-10-CM

## 2017-10-19 DIAGNOSIS — R22.9 LOCALIZED SWELLING, MASS AND LUMP, UNSPECIFIED: ICD-10-CM

## 2017-10-19 DIAGNOSIS — T30.0 BURN OF UNSPECIFIED BODY REGION, UNSPECIFIED DEGREE: ICD-10-CM

## 2017-10-19 PROCEDURE — 99213 OFFICE O/P EST LOW 20 MIN: CPT

## 2017-12-13 ENCOUNTER — APPOINTMENT (OUTPATIENT)
Dept: NEUROLOGY | Facility: CLINIC | Age: 37
End: 2017-12-13

## 2017-12-19 ENCOUNTER — OUTPATIENT (OUTPATIENT)
Dept: OUTPATIENT SERVICES | Facility: HOSPITAL | Age: 37
LOS: 1 days | End: 2017-12-19

## 2017-12-19 ENCOUNTER — APPOINTMENT (OUTPATIENT)
Dept: PLASTIC SURGERY | Facility: HOSPITAL | Age: 37
End: 2017-12-19

## 2017-12-19 VITALS
BODY MASS INDEX: 23.9 KG/M2 | DIASTOLIC BLOOD PRESSURE: 80 MMHG | WEIGHT: 140 LBS | SYSTOLIC BLOOD PRESSURE: 113 MMHG | HEART RATE: 82 BPM | HEIGHT: 64 IN

## 2017-12-19 DIAGNOSIS — Z90.49 ACQUIRED ABSENCE OF OTHER SPECIFIED PARTS OF DIGESTIVE TRACT: Chronic | ICD-10-CM

## 2017-12-19 DIAGNOSIS — Z98.89 OTHER SPECIFIED POSTPROCEDURAL STATES: Chronic | ICD-10-CM

## 2017-12-19 DIAGNOSIS — Z98.82 BREAST IMPLANT STATUS: ICD-10-CM

## 2017-12-19 DIAGNOSIS — N63 UNSPECIFIED LUMP IN BREAST: Chronic | ICD-10-CM

## 2017-12-19 DIAGNOSIS — H26.9 UNSPECIFIED CATARACT: Chronic | ICD-10-CM

## 2017-12-19 DIAGNOSIS — R92.1 MAMMOGRAPHIC CALCIFICATION FOUND ON DIAGNOSTIC IMAGING OF BREAST: Chronic | ICD-10-CM

## 2017-12-19 DIAGNOSIS — Z87.39 PERSONAL HISTORY OF OTHER DISEASES OF THE MUSCULOSKELETAL SYSTEM AND CONNECTIVE TISSUE: Chronic | ICD-10-CM

## 2017-12-19 DIAGNOSIS — N64.89 OTHER SPECIFIED DISORDERS OF BREAST: ICD-10-CM

## 2017-12-20 ENCOUNTER — APPOINTMENT (OUTPATIENT)
Dept: OPHTHALMOLOGY | Facility: CLINIC | Age: 37
End: 2017-12-20
Payer: MEDICARE

## 2017-12-20 PROCEDURE — 92014 COMPRE OPH EXAM EST PT 1/>: CPT

## 2018-01-02 ENCOUNTER — APPOINTMENT (OUTPATIENT)
Dept: PAIN MANAGEMENT | Facility: CLINIC | Age: 38
End: 2018-01-02
Payer: MEDICARE

## 2018-01-02 VITALS
HEART RATE: 66 BPM | DIASTOLIC BLOOD PRESSURE: 84 MMHG | SYSTOLIC BLOOD PRESSURE: 118 MMHG | BODY MASS INDEX: 23.05 KG/M2 | WEIGHT: 135 LBS | HEIGHT: 64 IN

## 2018-01-02 PROCEDURE — 99213 OFFICE O/P EST LOW 20 MIN: CPT

## 2018-01-02 RX ORDER — CARBOXYMETHYLCELLULOSE SODIUM 10 MG/ML
1 GEL OPHTHALMIC
Refills: 0 | Status: ACTIVE | COMMUNITY

## 2018-01-02 RX ORDER — CHOLECALCIFEROL (VITAMIN D3) 1250 MCG
1.25 MG CAPSULE ORAL
Refills: 0 | Status: ACTIVE | COMMUNITY
Start: 2018-01-02

## 2018-01-02 RX ORDER — HYPROMELLOSES AND CARBOXYMETHYLCELLULOSE SODIUM 3; 2.5 MG/ML; MG/ML
GEL OPHTHALMIC
Refills: 0 | Status: ACTIVE | COMMUNITY

## 2018-01-03 ENCOUNTER — APPOINTMENT (OUTPATIENT)
Dept: PAIN MANAGEMENT | Facility: CLINIC | Age: 38
End: 2018-01-03

## 2018-02-13 ENCOUNTER — APPOINTMENT (OUTPATIENT)
Dept: NEUROLOGY | Facility: CLINIC | Age: 38
End: 2018-02-13
Payer: MEDICARE

## 2018-02-13 VITALS
BODY MASS INDEX: 22.88 KG/M2 | HEART RATE: 65 BPM | DIASTOLIC BLOOD PRESSURE: 65 MMHG | SYSTOLIC BLOOD PRESSURE: 109 MMHG | HEIGHT: 64 IN | WEIGHT: 134 LBS

## 2018-02-13 PROCEDURE — 99214 OFFICE O/P EST MOD 30 MIN: CPT

## 2018-03-09 ENCOUNTER — APPOINTMENT (OUTPATIENT)
Dept: PAIN MANAGEMENT | Facility: CLINIC | Age: 38
End: 2018-03-09
Payer: MEDICARE

## 2018-03-09 VITALS
WEIGHT: 137 LBS | SYSTOLIC BLOOD PRESSURE: 94 MMHG | HEART RATE: 53 BPM | DIASTOLIC BLOOD PRESSURE: 65 MMHG | HEIGHT: 64 IN | BODY MASS INDEX: 23.39 KG/M2

## 2018-03-09 DIAGNOSIS — M26.609 UNSPECIFIED TEMPOROMANDIBULAR JOINT DISORDER: ICD-10-CM

## 2018-03-09 DIAGNOSIS — M48.00 SPINAL STENOSIS, SITE UNSPECIFIED: ICD-10-CM

## 2018-03-09 PROCEDURE — 99214 OFFICE O/P EST MOD 30 MIN: CPT | Mod: 25

## 2018-03-09 PROCEDURE — 64615 CHEMODENERV MUSC MIGRAINE: CPT

## 2018-03-30 ENCOUNTER — APPOINTMENT (OUTPATIENT)
Dept: PAIN MANAGEMENT | Facility: CLINIC | Age: 38
End: 2018-03-30
Payer: MEDICARE

## 2018-03-30 VITALS
BODY MASS INDEX: 23.05 KG/M2 | SYSTOLIC BLOOD PRESSURE: 101 MMHG | WEIGHT: 135 LBS | HEIGHT: 64 IN | DIASTOLIC BLOOD PRESSURE: 62 MMHG | HEART RATE: 59 BPM

## 2018-03-30 PROCEDURE — 99213 OFFICE O/P EST LOW 20 MIN: CPT

## 2018-03-30 RX ORDER — GABAPENTIN 100 MG/1
100 CAPSULE ORAL
Qty: 60 | Refills: 0 | Status: ACTIVE | COMMUNITY
Start: 2018-02-06

## 2018-03-30 RX ORDER — CYCLOBENZAPRINE HYDROCHLORIDE 10 MG/1
10 TABLET, FILM COATED ORAL
Qty: 90 | Refills: 0 | Status: ACTIVE | COMMUNITY
Start: 2018-01-11

## 2018-04-30 ENCOUNTER — APPOINTMENT (OUTPATIENT)
Dept: NEUROLOGY | Facility: CLINIC | Age: 38
End: 2018-04-30
Payer: MEDICARE

## 2018-04-30 VITALS
DIASTOLIC BLOOD PRESSURE: 68 MMHG | HEIGHT: 64 IN | HEART RATE: 59 BPM | WEIGHT: 135 LBS | BODY MASS INDEX: 23.05 KG/M2 | SYSTOLIC BLOOD PRESSURE: 105 MMHG

## 2018-04-30 DIAGNOSIS — G71.11 MYOTONIC MUSCULAR DYSTROPHY: ICD-10-CM

## 2018-04-30 DIAGNOSIS — Z86.69 PERSONAL HISTORY OF OTHER DISEASES OF THE NERVOUS SYSTEM AND SENSE ORGANS: ICD-10-CM

## 2018-04-30 PROCEDURE — 99215 OFFICE O/P EST HI 40 MIN: CPT

## 2018-04-30 RX ORDER — PREDNISOLONE ACETATE 10 MG/ML
1 SUSPENSION/ DROPS OPHTHALMIC
Qty: 1 | Refills: 1 | Status: DISCONTINUED | COMMUNITY
Start: 2017-01-19 | End: 2018-04-30

## 2018-04-30 RX ORDER — OFLOXACIN 3 MG/ML
0.3 SOLUTION/ DROPS OPHTHALMIC 4 TIMES DAILY
Qty: 1 | Refills: 1 | Status: DISCONTINUED | COMMUNITY
Start: 2017-01-19 | End: 2018-04-30

## 2018-05-06 ENCOUNTER — RX RENEWAL (OUTPATIENT)
Age: 38
End: 2018-05-06

## 2018-05-10 ENCOUNTER — RX RENEWAL (OUTPATIENT)
Age: 38
End: 2018-05-10

## 2018-06-12 ENCOUNTER — RX RENEWAL (OUTPATIENT)
Age: 38
End: 2018-06-12

## 2018-06-25 ENCOUNTER — APPOINTMENT (OUTPATIENT)
Dept: NEUROLOGY | Facility: CLINIC | Age: 38
End: 2018-06-25
Payer: MEDICARE

## 2018-06-25 DIAGNOSIS — R25.2 CRAMP AND SPASM: ICD-10-CM

## 2018-06-25 PROCEDURE — 99213 OFFICE O/P EST LOW 20 MIN: CPT | Mod: 25

## 2018-06-25 PROCEDURE — 95907 NVR CNDJ TST 1-2 STUDIES: CPT

## 2018-06-25 PROCEDURE — 95885 MUSC TST DONE W/NERV TST LIM: CPT

## 2018-06-25 RX ORDER — TIZANIDINE HYDROCHLORIDE 2 MG/1
2 CAPSULE ORAL
Qty: 60 | Refills: 2 | Status: ACTIVE | COMMUNITY
Start: 2018-06-25 | End: 1900-01-01

## 2018-06-28 ENCOUNTER — APPOINTMENT (OUTPATIENT)
Dept: SURGICAL ONCOLOGY | Facility: CLINIC | Age: 38
End: 2018-06-28
Payer: MEDICARE

## 2018-06-28 VITALS
BODY MASS INDEX: 22.88 KG/M2 | DIASTOLIC BLOOD PRESSURE: 70 MMHG | HEART RATE: 66 BPM | WEIGHT: 134 LBS | HEIGHT: 64 IN | SYSTOLIC BLOOD PRESSURE: 104 MMHG | OXYGEN SATURATION: 100 %

## 2018-06-28 DIAGNOSIS — N64.9 DISORDER OF BREAST, UNSPECIFIED: ICD-10-CM

## 2018-06-28 PROCEDURE — 99213 OFFICE O/P EST LOW 20 MIN: CPT

## 2018-10-03 ENCOUNTER — APPOINTMENT (OUTPATIENT)
Dept: NEUROLOGY | Facility: CLINIC | Age: 38
End: 2018-10-03
Payer: MEDICARE

## 2018-10-03 VITALS
SYSTOLIC BLOOD PRESSURE: 111 MMHG | HEIGHT: 64 IN | BODY MASS INDEX: 22.02 KG/M2 | DIASTOLIC BLOOD PRESSURE: 70 MMHG | HEART RATE: 50 BPM | WEIGHT: 129 LBS

## 2018-10-03 PROCEDURE — 99214 OFFICE O/P EST MOD 30 MIN: CPT

## 2018-10-04 ENCOUNTER — APPOINTMENT (OUTPATIENT)
Dept: PAIN MANAGEMENT | Facility: CLINIC | Age: 38
End: 2018-10-04

## 2018-10-04 ENCOUNTER — APPOINTMENT (OUTPATIENT)
Dept: DERMATOLOGY | Facility: CLINIC | Age: 38
End: 2018-10-04
Payer: MEDICARE

## 2018-10-04 VITALS
BODY MASS INDEX: 21.85 KG/M2 | WEIGHT: 128 LBS | SYSTOLIC BLOOD PRESSURE: 114 MMHG | DIASTOLIC BLOOD PRESSURE: 70 MMHG | HEIGHT: 64 IN

## 2018-10-04 DIAGNOSIS — D18.01 HEMANGIOMA OF SKIN AND SUBCUTANEOUS TISSUE: ICD-10-CM

## 2018-10-04 DIAGNOSIS — D22.9 MELANOCYTIC NEVI, UNSPECIFIED: ICD-10-CM

## 2018-10-04 DIAGNOSIS — L81.4 OTHER MELANIN HYPERPIGMENTATION: ICD-10-CM

## 2018-10-04 DIAGNOSIS — Z12.83 ENCOUNTER FOR SCREENING FOR MALIGNANT NEOPLASM OF SKIN: ICD-10-CM

## 2018-10-04 PROCEDURE — 99214 OFFICE O/P EST MOD 30 MIN: CPT

## 2018-10-05 PROBLEM — L81.4 LENTIGINES: Status: ACTIVE | Noted: 2018-10-04

## 2018-10-05 PROBLEM — D18.01 CHERRY ANGIOMA: Status: ACTIVE | Noted: 2018-10-04

## 2018-10-05 PROBLEM — D22.9 MULTIPLE BENIGN NEVI: Status: ACTIVE | Noted: 2018-10-04

## 2018-10-11 DIAGNOSIS — L29.9 PRURITUS, UNSPECIFIED: ICD-10-CM

## 2018-10-11 RX ORDER — HYDROCORTISONE 25 MG/G
2.5 CREAM TOPICAL
Qty: 2 | Refills: 2 | Status: ACTIVE | COMMUNITY
Start: 2018-10-11 | End: 1900-01-01

## 2018-10-17 ENCOUNTER — OTHER (OUTPATIENT)
Age: 38
End: 2018-10-17

## 2018-11-05 ENCOUNTER — APPOINTMENT (OUTPATIENT)
Dept: PAIN MANAGEMENT | Facility: CLINIC | Age: 38
End: 2018-11-05
Payer: MEDICARE

## 2018-11-05 ENCOUNTER — OUTPATIENT (OUTPATIENT)
Dept: OUTPATIENT SERVICES | Facility: HOSPITAL | Age: 38
LOS: 1 days | End: 2018-11-05

## 2018-11-05 ENCOUNTER — APPOINTMENT (OUTPATIENT)
Dept: PLASTIC SURGERY | Facility: HOSPITAL | Age: 38
End: 2018-11-05

## 2018-11-05 VITALS
HEIGHT: 64 IN | WEIGHT: 128 LBS | HEART RATE: 44 BPM | BODY MASS INDEX: 21.85 KG/M2 | SYSTOLIC BLOOD PRESSURE: 102 MMHG | DIASTOLIC BLOOD PRESSURE: 66 MMHG

## 2018-11-05 VITALS — HEIGHT: 64 IN | DIASTOLIC BLOOD PRESSURE: 71 MMHG | SYSTOLIC BLOOD PRESSURE: 103 MMHG | BODY MASS INDEX: 21.97 KG/M2

## 2018-11-05 DIAGNOSIS — Z90.49 ACQUIRED ABSENCE OF OTHER SPECIFIED PARTS OF DIGESTIVE TRACT: Chronic | ICD-10-CM

## 2018-11-05 DIAGNOSIS — Z98.89 OTHER SPECIFIED POSTPROCEDURAL STATES: Chronic | ICD-10-CM

## 2018-11-05 DIAGNOSIS — Z98.82 BREAST IMPLANT STATUS: ICD-10-CM

## 2018-11-05 DIAGNOSIS — R92.1 MAMMOGRAPHIC CALCIFICATION FOUND ON DIAGNOSTIC IMAGING OF BREAST: Chronic | ICD-10-CM

## 2018-11-05 DIAGNOSIS — Z87.39 PERSONAL HISTORY OF OTHER DISEASES OF THE MUSCULOSKELETAL SYSTEM AND CONNECTIVE TISSUE: Chronic | ICD-10-CM

## 2018-11-05 DIAGNOSIS — H26.9 UNSPECIFIED CATARACT: Chronic | ICD-10-CM

## 2018-11-05 DIAGNOSIS — N63 UNSPECIFIED LUMP IN BREAST: Chronic | ICD-10-CM

## 2018-11-05 PROCEDURE — 99214 OFFICE O/P EST MOD 30 MIN: CPT | Mod: 25

## 2018-11-05 PROCEDURE — 64615 CHEMODENERV MUSC MIGRAINE: CPT

## 2018-11-06 DIAGNOSIS — Z98.82 BREAST IMPLANT STATUS: ICD-10-CM

## 2018-11-16 NOTE — ASU PREOP CHECKLIST - DENTURES
Wife returned call  Scheduled physical with Dr Jaylyn Verduzco on March 20, 2019   - was also advised fasting labs were ordered no

## 2018-11-19 ENCOUNTER — APPOINTMENT (OUTPATIENT)
Dept: PLASTIC SURGERY | Facility: HOSPITAL | Age: 38
End: 2018-11-19

## 2018-11-19 ENCOUNTER — OUTPATIENT (OUTPATIENT)
Dept: OUTPATIENT SERVICES | Facility: HOSPITAL | Age: 38
LOS: 1 days | End: 2018-11-19

## 2018-11-19 ENCOUNTER — OTHER (OUTPATIENT)
Age: 38
End: 2018-11-19

## 2018-11-19 VITALS
SYSTOLIC BLOOD PRESSURE: 110 MMHG | BODY MASS INDEX: 21.17 KG/M2 | WEIGHT: 124 LBS | HEIGHT: 64 IN | DIASTOLIC BLOOD PRESSURE: 74 MMHG

## 2018-11-19 DIAGNOSIS — R92.1 MAMMOGRAPHIC CALCIFICATION FOUND ON DIAGNOSTIC IMAGING OF BREAST: Chronic | ICD-10-CM

## 2018-11-19 DIAGNOSIS — Z98.89 OTHER SPECIFIED POSTPROCEDURAL STATES: Chronic | ICD-10-CM

## 2018-11-19 DIAGNOSIS — Z90.49 ACQUIRED ABSENCE OF OTHER SPECIFIED PARTS OF DIGESTIVE TRACT: Chronic | ICD-10-CM

## 2018-11-19 DIAGNOSIS — N63 UNSPECIFIED LUMP IN BREAST: Chronic | ICD-10-CM

## 2018-11-19 DIAGNOSIS — Z87.898 PERSONAL HISTORY OF OTHER SPECIFIED CONDITIONS: ICD-10-CM

## 2018-11-19 DIAGNOSIS — F17.200 NICOTINE DEPENDENCE, UNSPECIFIED, UNCOMPLICATED: ICD-10-CM

## 2018-11-19 DIAGNOSIS — H26.9 UNSPECIFIED CATARACT: Chronic | ICD-10-CM

## 2018-11-19 DIAGNOSIS — Z87.39 PERSONAL HISTORY OF OTHER DISEASES OF THE MUSCULOSKELETAL SYSTEM AND CONNECTIVE TISSUE: Chronic | ICD-10-CM

## 2018-11-20 DIAGNOSIS — F17.200 NICOTINE DEPENDENCE, UNSPECIFIED, UNCOMPLICATED: ICD-10-CM

## 2018-11-20 DIAGNOSIS — Z87.898 PERSONAL HISTORY OF OTHER SPECIFIED CONDITIONS: ICD-10-CM

## 2018-12-12 ENCOUNTER — RX RENEWAL (OUTPATIENT)
Age: 38
End: 2018-12-12

## 2019-01-30 ENCOUNTER — APPOINTMENT (OUTPATIENT)
Dept: OPHTHALMOLOGY | Facility: CLINIC | Age: 39
End: 2019-01-30
Payer: MEDICARE

## 2019-01-30 DIAGNOSIS — H04.123 DRY EYE SYNDROME OF BILATERAL LACRIMAL GLANDS: ICD-10-CM

## 2019-01-30 PROCEDURE — 92014 COMPRE OPH EXAM EST PT 1/>: CPT

## 2019-01-30 RX ORDER — HYPROMELLOSE 0 G/G
0.3 GEL OPHTHALMIC
Qty: 1 | Refills: 5 | Status: ACTIVE | COMMUNITY
Start: 2019-01-30 | End: 1900-01-01

## 2019-02-13 ENCOUNTER — APPOINTMENT (OUTPATIENT)
Dept: PAIN MANAGEMENT | Facility: CLINIC | Age: 39
End: 2019-02-13
Payer: MEDICARE

## 2019-02-13 VITALS
BODY MASS INDEX: 21.34 KG/M2 | DIASTOLIC BLOOD PRESSURE: 80 MMHG | SYSTOLIC BLOOD PRESSURE: 125 MMHG | HEIGHT: 64 IN | HEART RATE: 50 BPM | WEIGHT: 125 LBS

## 2019-02-13 DIAGNOSIS — G43.709 CHRONIC MIGRAINE W/OUT AURA, NOT INTRACTABLE, W/OUT STATUS MIGRAINOSUS: ICD-10-CM

## 2019-02-13 DIAGNOSIS — R51 HEADACHE: ICD-10-CM

## 2019-02-13 PROCEDURE — 99214 OFFICE O/P EST MOD 30 MIN: CPT | Mod: 25

## 2019-02-13 PROCEDURE — 64615 CHEMODENERV MUSC MIGRAINE: CPT

## 2019-02-13 NOTE — REVIEW OF SYSTEMS
[Fever] : no fever [Chills] : no chills [Feeling Poorly] : feeling poorly [Feeling Tired] : feeling tired [Eyesight Problems] : no eyesight problems [Loss Of Hearing] : no hearing loss [Chest Pain] : no chest pain [Palpitations] : no palpitations [Cough] : no cough [Arthralgias] : arthralgias [Neck Pain] : neck pain [Skin Lesions] : no skin lesions [Skin Wound] : no skin wound [Itching] : no itching [Dizziness] : dizziness [Sleep Disturbances] : sleep disturbances [Muscle Weakness] : no muscle weakness [Swollen Glands] : no swollen glands [Swollen Glands In The Neck] : no swollen glands in the neck

## 2019-02-13 NOTE — PHYSICAL EXAM
[General Appearance - Alert] : alert [General Appearance - In No Acute Distress] : in no acute distress [General Appearance - Well Nourished] : well nourished [General Appearance - Well Developed] : well developed [General Appearance - Well-Appearing] : healthy appearing [Oriented To Time, Place, And Person] : oriented to person, place, and time [Affect] : the affect was normal [Mood] : the mood was normal [Memory Recent] : recent memory was not impaired [Memory Remote] : remote memory was not impaired [Person] : oriented to person [Place] : oriented to place [Time] : oriented to time [Short Term Intact] : short term memory intact [Remote Intact] : remote memory intact [Registration Intact] : recent registration memory intact [Naming Objects] : no difficulty naming common objects [Writing A Sentence] : no difficulty writing a sentence [Fluency] : fluency intact [Comprehension] : comprehension intact [Reading] : reading intact [Current Events] : adequate knowledge of current events [Past History] : adequate knowledge of personal past history [Vocabulary] : adequate range of vocabulary [Cranial Nerves Facial (VII)] : face symmetrical [Cranial Nerves Accessory (XI - Cranial And Spinal)] : head turning and shoulder shrug symmetric [Cranial Nerves Hypoglossal (XII)] : there was no tongue deviation with protrusion [Motor Strength] : muscle strength was normal in all four extremities [No Muscle Atrophy] : normal bulk in all four extremities [Motor Handedness Right-Handed] : the patient is right hand dominant [Paresis Pronator Drift Right-Sided] : no pronator drift on the right [Paresis Pronator Drift Left-Sided] : no pronator drift on the left [Motor Strength Upper Extremities Bilaterally] : strength was normal in both upper extremities [Motor Strength Lower Extremities Bilaterally] : strength was normal in both lower extremities [Sensation Tactile Decrease] : light touch was intact [Romberg's Sign] : Romberg's sign was negtive [Allodynia] : no ~T allodynia present [Balance] : balance was intact [Limited Balance] : balance was intact [Past-pointing] : there was no past-pointing [Tremor] : no tremor present [Dysdiadochokinesia Bilaterally] : not present [Coordination - Dysmetria Impaired Finger-to-Nose Bilateral] : not present [Outer Ear] : the ears and nose were normal in appearance [Neck Appearance] : the appearance of the neck was normal [Exaggerated Use Of Accessory Muscles For Inspiration] : no accessory muscle use [Edema] : there was no peripheral edema [Abnormal Walk] : normal gait [Nail Clubbing] : no clubbing  or cyanosis of the fingernails [Involuntary Movements] : no involuntary movements were seen [Musculoskeletal - Swelling] : no joint swelling seen [Motor Tone] : muscle strength and tone were normal [Skin Color & Pigmentation] : normal skin color and pigmentation [] : no rash

## 2019-02-13 NOTE — PROCEDURE
[FreeTextEntry1] : 200 units of botox in 4 cc normal saline\par .2 in procerus\par .1 in left and right \par .1 in 2 left and 2 right frontalis\par .1 in 4 left and 4 right temporalis\par .1 in 3 left and 3 right occipitalis\par .1 in 2 left and 2 right paraspinals \par .1 in 3 left and 3 right trapezius [Chronic migraine] : Chronic migraine [Consent Signed] : consent signed [Adverse Effects] : no adverse effects [Continue Current Treatment] : continue current treatment

## 2019-02-13 NOTE — HISTORY OF PRESENT ILLNESS
[FreeTextEntry1] : Pt notes she is going to move soon but is anxious to give a trial of Botox injections.  She notes previous history of botox for tmjd but is anxious re: getting these done today.\par Does feel concerned re: this change in location.\par No other new medical complaints.\par Feels baclofen not as effective for spasm as she would desire. [Chronic Headache] : chronic headache [Nausea] : nausea [Photophobia] : photophobia [Phonophobia] : phonophobia [Scalp Tenderness] : scalp tenderness [> 15 days per month] : > 15 days per month [> 4 hours] : > 4 hours [stayed the same] : stayed the same [3] : a minimum pain level of 3/10 [8] : a maximum pain level of 8/10 [Stable] : The patient reports ~his/her~ symptoms since the last visit are stable

## 2019-04-30 ENCOUNTER — RX RENEWAL (OUTPATIENT)
Age: 39
End: 2019-04-30

## 2019-05-29 ENCOUNTER — RX RENEWAL (OUTPATIENT)
Age: 39
End: 2019-05-29

## 2019-05-30 ENCOUNTER — RX RENEWAL (OUTPATIENT)
Age: 39
End: 2019-05-30

## 2019-06-04 ENCOUNTER — RX RENEWAL (OUTPATIENT)
Age: 39
End: 2019-06-04

## 2019-06-29 ENCOUNTER — RX RENEWAL (OUTPATIENT)
Age: 39
End: 2019-06-29

## 2019-07-01 ENCOUNTER — RX RENEWAL (OUTPATIENT)
Age: 39
End: 2019-07-01

## 2019-07-11 ENCOUNTER — APPOINTMENT (OUTPATIENT)
Dept: SURGICAL ONCOLOGY | Facility: CLINIC | Age: 39
End: 2019-07-11

## 2019-07-22 ENCOUNTER — RX RENEWAL (OUTPATIENT)
Age: 39
End: 2019-07-22

## 2019-07-30 ENCOUNTER — RX RENEWAL (OUTPATIENT)
Age: 39
End: 2019-07-30

## 2019-07-30 RX ORDER — BACLOFEN 10 MG/1
10 TABLET ORAL 3 TIMES DAILY
Qty: 90 | Refills: 0 | Status: ACTIVE | COMMUNITY
Start: 2018-06-26 | End: 1900-01-01

## 2019-10-07 ENCOUNTER — MEDICATION RENEWAL (OUTPATIENT)
Age: 39
End: 2019-10-07

## 2019-10-08 ENCOUNTER — APPOINTMENT (OUTPATIENT)
Dept: NEUROLOGY | Facility: CLINIC | Age: 39
End: 2019-10-08
Payer: MEDICARE

## 2019-10-08 VITALS
HEIGHT: 64 IN | WEIGHT: 130 LBS | DIASTOLIC BLOOD PRESSURE: 68 MMHG | BODY MASS INDEX: 22.2 KG/M2 | HEART RATE: 70 BPM | SYSTOLIC BLOOD PRESSURE: 102 MMHG

## 2019-10-08 PROCEDURE — 99214 OFFICE O/P EST MOD 30 MIN: CPT

## 2019-10-08 NOTE — DATA REVIEWED
[No studies available for review at this time.] : No studies available for review at this time. [de-identified] : 2015 - Right temporal slowing and breach

## 2019-10-08 NOTE — REVIEW OF SYSTEMS
[Migraine Headache] : migraine headaches [Sleep Disturbances] : sleep disturbances [Negative] : Heme/Lymph [de-identified] : frequent migraines [de-identified] : insomnia [FreeTextEntry9] : she at times has painful cramps of the fingers and toes at times.

## 2019-10-08 NOTE — DISCUSSION/SUMMARY
[Well-controlled] : well-controlled [FreeTextEntry1] : Patient doing well with seizures\par reviewed seizure triggers\par \par Plan:\par Will continue Keppra XR 500mg qhs \par reviewed seizure triggers\par annual labwork\par Follow-up in 6-12  months

## 2019-10-08 NOTE — PHYSICAL EXAM
[General Appearance - Alert] : alert [General Appearance - In No Acute Distress] : in no acute distress [Oriented To Time, Place, And Person] : oriented to person, place, and time [Impaired Insight] : insight and judgment were intact [Affect] : the affect was normal [Person] : oriented to person [Place] : oriented to place [Time] : oriented to time [Short Term Intact] : short term memory intact [Remote Intact] : remote memory intact [Registration Intact] : recent registration memory intact [Span Intact] : the attention span was normal [Concentration Intact] : normal concentrating ability [Visual Intact] : visual attention was ~T not ~L decreased [Naming Objects] : no difficulty naming common objects [Repeating Phrases] : no difficulty repeating a phrase [Fluency] : fluency intact [Writing A Sentence] : no difficulty writing a sentence [Comprehension] : comprehension intact [Reading] : reading intact [Current Events] : adequate knowledge of current events [Past History] : adequate knowledge of personal past history [Vocabulary] : adequate range of vocabulary [Cranial Nerves Oculomotor (III)] : extraocular motion intact [Cranial Nerves Optic (II)] : visual acuity intact bilaterally,  visual fields full to confrontation, pupils equal round and reactive to light [Cranial Nerves Trigeminal (V)] : facial sensation intact symmetrically [Cranial Nerves Vestibulocochlear (VIII)] : hearing was intact bilaterally [Cranial Nerves Facial (VII)] : face symmetrical [Cranial Nerves Accessory (XI - Cranial And Spinal)] : head turning and shoulder shrug symmetric [Cranial Nerves Glossopharyngeal (IX)] : tongue and palate midline [Cranial Nerves Hypoglossal (XII)] : there was no tongue deviation with protrusion [Motor Strength] : muscle strength was normal in all four extremities [No Muscle Atrophy] : normal bulk in all four extremities [Sensation Tactile Decrease] : light touch was intact [Sensation Pain / Temperature Decrease] : pain and temperature was intact [Sensation Vibration Decrease] : vibration was intact [Proprioception] : proprioception was intact [Balance] : balance was intact [2+] : Ankle jerk left 2+ [Sclera] : the sclera and conjunctiva were normal [PERRL With Normal Accommodation] : pupils were equal in size, round, reactive to light, with normal accommodation [Extraocular Movements] : extraocular movements were intact [Outer Ear] : the ears and nose were normal in appearance [Oropharynx] : the oropharynx was normal [Abnormal Walk] : normal gait [Nail Clubbing] : no clubbing  or cyanosis of the fingernails [Musculoskeletal - Swelling] : no joint swelling seen [Motor Tone] : muscle strength and tone were normal [Past-pointing] : there was no past-pointing [Tremor] : no tremor present [Plantar Reflex Right Only] : normal on the right [Plantar Reflex Left Only] : normal on the left
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
## 2020-01-12 ENCOUNTER — RX RENEWAL (OUTPATIENT)
Age: 40
End: 2020-01-12

## 2020-05-05 NOTE — ASU PREOP CHECKLIST - WEIGHT IN KG
62.6 [Negative] : Heme/Lymph [Recent Weight Loss (___ Lbs)] : no recent weight loss [Shortness Of Breath] : no shortness of breath [Dyspnea on exertion] : not dyspnea during exertion [Lower Ext Edema] : no extremity edema

## 2021-03-30 ENCOUNTER — EMERGENCY (EMERGENCY)
Facility: HOSPITAL | Age: 41
LOS: 0 days | Discharge: HOME | End: 2021-03-30
Payer: COMMERCIAL

## 2021-03-30 VITALS
HEIGHT: 63.5 IN | WEIGHT: 139.99 LBS | RESPIRATION RATE: 19 BRPM | TEMPERATURE: 98 F | DIASTOLIC BLOOD PRESSURE: 63 MMHG | SYSTOLIC BLOOD PRESSURE: 100 MMHG | HEART RATE: 65 BPM
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
## 2021-03-30 DIAGNOSIS — Z04.1 ENCOUNTER FOR EXAMINATION AND OBSERVATION FOLLOWING TRANSPORT ACCIDENT: ICD-10-CM

## 2021-03-30 DIAGNOSIS — N87.9 DYSPLASIA OF CERVIX UTERI, UNSPECIFIED: ICD-10-CM

## 2021-03-30 DIAGNOSIS — F17.200 NICOTINE DEPENDENCE, UNSPECIFIED, UNCOMPLICATED: ICD-10-CM

## 2021-03-30 DIAGNOSIS — R92.1 MAMMOGRAPHIC CALCIFICATION FOUND ON DIAGNOSTIC IMAGING OF BREAST: Chronic | ICD-10-CM

## 2021-03-30 DIAGNOSIS — Z98.89 OTHER SPECIFIED POSTPROCEDURAL STATES: Chronic | ICD-10-CM

## 2021-03-30 DIAGNOSIS — Z90.49 ACQUIRED ABSENCE OF OTHER SPECIFIED PARTS OF DIGESTIVE TRACT: Chronic | ICD-10-CM

## 2021-03-30 DIAGNOSIS — S16.1XXA STRAIN OF MUSCLE, FASCIA AND TENDON AT NECK LEVEL, INITIAL ENCOUNTER: ICD-10-CM
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
## 2021-03-30 DIAGNOSIS — M26.629 ARTHRALGIA OF TEMPOROMANDIBULAR JOINT, UNSPECIFIED SIDE: ICD-10-CM

## 2021-03-30 DIAGNOSIS — N63 UNSPECIFIED LUMP IN BREAST: Chronic | ICD-10-CM

## 2021-03-30 DIAGNOSIS — H26.9 UNSPECIFIED CATARACT: Chronic | ICD-10-CM

## 2021-03-30 DIAGNOSIS — Z88.0 ALLERGY STATUS TO PENICILLIN: ICD-10-CM

## 2021-03-30 DIAGNOSIS — N60.09 SOLITARY CYST OF UNSPECIFIED BREAST: ICD-10-CM

## 2021-03-30 DIAGNOSIS — Z87.39 PERSONAL HISTORY OF OTHER DISEASES OF THE MUSCULOSKELETAL SYSTEM AND CONNECTIVE TISSUE: Chronic | ICD-10-CM

## 2021-03-30 PROCEDURE — 99285 EMERGENCY DEPT VISIT HI MDM: CPT

## 2021-03-30 RX ORDER — ACETAMINOPHEN 500 MG
975 TABLET ORAL ONCE
Refills: 0 | Status: COMPLETED | OUTPATIENT
Start: 2021-03-30 | End: 2021-03-30

## 2021-03-30 RX ORDER — LIDOCAINE 4 G/100G
1 CREAM TOPICAL ONCE
Refills: 0 | Status: COMPLETED | OUTPATIENT
Start: 2021-03-30 | End: 2021-03-30

## 2021-03-30 RX ADMIN — Medication 975 MILLIGRAM(S): at 17:00

## 2021-03-30 RX ADMIN — LIDOCAINE 1 PATCH: 4 CREAM TOPICAL at 17:01

## 2021-03-30 NOTE — ED ADULT NURSE NOTE - PMH
Arthralgia of temporomandibular joint    Benign breast cyst in female    Bradycardia  pt currently taking propranolol for chronic headaches  Cervical dysplasia  s/p ablation  Chronic headaches  treated with propanolol  EP (epilepsy)  last seizure in 2012

## 2021-03-30 NOTE — ED PROVIDER NOTE - PATIENT PORTAL LINK FT
You can access the FollowMyHealth Patient Portal offered by [facility] by registering at the following website: [email or web address]. By joining [facility]’s FollowMyHealth portal, you will also be able to view your health information using other applications (apps) compatible with our system.

## 2021-03-30 NOTE — ED PROVIDER NOTE - PROGRESS NOTE DETAILS
Supervising Statement (MONTRELL Griffin): I have personally seen and examined this patient.  I have fully participated in the care of this patient. I have reviewed all pertinent clinical information, including history, physical exam, plan and the ACP Fellow's note and agree except as noted.

## 2021-03-30 NOTE — ED ADULT NURSE NOTE - BREATH SOUNDS, RIGHT
clear normal... Well appearing, well nourished, awake, alert, oriented to person, place, time/situation and in no apparent distress.
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
## 2021-03-30 NOTE — ED PROVIDER NOTE - NS ED ROS FT
Constitutional: (-) Fever, (-) Chills  Skin: (-) Color changes, (-) Rashes, (-) Wounds  Eyes: (-) Visual changes, (-) Discharge, (-) Redness  Ears: (-) Hearing loss, (-)Tinnitus, (-) Ear pain  Nose: (-) Nasal congestion, (-) Runny nose  Mouth/Throat: (-) Sore throat, (-) Vocal changes  Neck: (+) Pain, (-) Stiffness, (-) Limited range of motion, (-) Mass  CV: (-) Chest pain, (-) Palpitations  Resp: (-) Cough, (-) Shortness of breath  GI: (-) Abdominal pain, (-) Nausea, (-) Vomiting, (-) Diarrhea  : (-) Dysuria, (-) Hematuria, (-) Increased frequency, (-) Incontinence, (-) Retention, (-) Saddle anesthesia   MSK: (+) Myalgias, (-) Weakness, (-) Injuries, (-) Deformities, (-) Back pain, (-) Calf pain, (-) Neck pain  Neuro: (-) Loss of consciousness, (-) Headache, (-) Seizure, (-) Confusion
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
## 2021-03-30 NOTE — ED ADULT NURSE NOTE - PSH
Breast calcification, right  breast biopsy  Breast lump in female  left breast lumpectomy  Cataract  b/l  left 12/19/2016 & right 1/30/2017 with b/l IOL implanted  craniotomy  3/2006 placement of EEG leads  craniotomy- removal right temporal lobe  4/2006  gastric bypass  2011 lost 100 pounds  H/O bilateral mastectomy    History of TMJ disorder  Arthroscopic surgery right side  S/P breast reconstruction, bilateral    S/P cholecystectomy  3/2014  Status post VNS (vagus nerve stimulator) placement  implanted, and removed 2006

## 2023-01-01 ENCOUNTER — NON-APPOINTMENT (OUTPATIENT)
Age: 43
End: 2023-01-01

## 2023-01-07 ENCOUNTER — EMERGENCY (EMERGENCY)
Facility: HOSPITAL | Age: 43
LOS: 1 days | Discharge: HOME | End: 2023-01-07
Attending: EMERGENCY MEDICINE | Admitting: EMERGENCY MEDICINE
Payer: MEDICARE

## 2023-01-07 VITALS
DIASTOLIC BLOOD PRESSURE: 90 MMHG | RESPIRATION RATE: 18 BRPM | SYSTOLIC BLOOD PRESSURE: 118 MMHG | TEMPERATURE: 98 F | OXYGEN SATURATION: 100 % | HEART RATE: 84 BPM

## 2023-01-07 VITALS
SYSTOLIC BLOOD PRESSURE: 105 MMHG | DIASTOLIC BLOOD PRESSURE: 69 MMHG | RESPIRATION RATE: 18 BRPM | HEART RATE: 66 BPM | TEMPERATURE: 98 F | OXYGEN SATURATION: 99 %

## 2023-01-07 DIAGNOSIS — Z98.89 OTHER SPECIFIED POSTPROCEDURAL STATES: Chronic | ICD-10-CM

## 2023-01-07 DIAGNOSIS — R92.1 MAMMOGRAPHIC CALCIFICATION FOUND ON DIAGNOSTIC IMAGING OF BREAST: Chronic | ICD-10-CM

## 2023-01-07 DIAGNOSIS — Z87.39 PERSONAL HISTORY OF OTHER DISEASES OF THE MUSCULOSKELETAL SYSTEM AND CONNECTIVE TISSUE: Chronic | ICD-10-CM

## 2023-01-07 DIAGNOSIS — N63 UNSPECIFIED LUMP IN BREAST: Chronic | ICD-10-CM

## 2023-01-07 DIAGNOSIS — Z90.49 ACQUIRED ABSENCE OF OTHER SPECIFIED PARTS OF DIGESTIVE TRACT: Chronic | ICD-10-CM

## 2023-01-07 DIAGNOSIS — H26.9 UNSPECIFIED CATARACT: Chronic | ICD-10-CM

## 2023-01-07 LAB
ALBUMIN SERPL ELPH-MCNC: 4.5 G/DL — SIGNIFICANT CHANGE UP (ref 3.3–5)
ALP SERPL-CCNC: 90 U/L — SIGNIFICANT CHANGE UP (ref 40–120)
ALT FLD-CCNC: 8 U/L — SIGNIFICANT CHANGE UP (ref 4–33)
ANION GAP SERPL CALC-SCNC: 12 MMOL/L — SIGNIFICANT CHANGE UP (ref 7–14)
APTT BLD: 27.4 SEC — SIGNIFICANT CHANGE UP (ref 27–36.3)
AST SERPL-CCNC: 19 U/L — SIGNIFICANT CHANGE UP (ref 4–32)
B PERT DNA SPEC QL NAA+PROBE: SIGNIFICANT CHANGE UP
B PERT+PARAPERT DNA PNL SPEC NAA+PROBE: SIGNIFICANT CHANGE UP
BASE EXCESS BLDV CALC-SCNC: 1.2 MMOL/L — SIGNIFICANT CHANGE UP (ref -2–3)
BASOPHILS # BLD AUTO: 0.03 K/UL — SIGNIFICANT CHANGE UP (ref 0–0.2)
BASOPHILS NFR BLD AUTO: 0.3 % — SIGNIFICANT CHANGE UP (ref 0–2)
BILIRUB SERPL-MCNC: 0.4 MG/DL — SIGNIFICANT CHANGE UP (ref 0.2–1.2)
BLD GP AB SCN SERPL QL: NEGATIVE — SIGNIFICANT CHANGE UP
BLOOD GAS VENOUS COMPREHENSIVE RESULT: SIGNIFICANT CHANGE UP
BORDETELLA PARAPERTUSSIS (RAPRVP): SIGNIFICANT CHANGE UP
BUN SERPL-MCNC: 12 MG/DL — SIGNIFICANT CHANGE UP (ref 7–23)
C PNEUM DNA SPEC QL NAA+PROBE: SIGNIFICANT CHANGE UP
CALCIUM SERPL-MCNC: 9.4 MG/DL — SIGNIFICANT CHANGE UP (ref 8.4–10.5)
CHLORIDE BLDV-SCNC: 105 MMOL/L — SIGNIFICANT CHANGE UP (ref 96–108)
CHLORIDE SERPL-SCNC: 105 MMOL/L — SIGNIFICANT CHANGE UP (ref 98–107)
CO2 BLDV-SCNC: 28 MMOL/L — HIGH (ref 22–26)
CO2 SERPL-SCNC: 24 MMOL/L — SIGNIFICANT CHANGE UP (ref 22–31)
CREAT SERPL-MCNC: 0.76 MG/DL — SIGNIFICANT CHANGE UP (ref 0.5–1.3)
EGFR: 99 ML/MIN/1.73M2 — SIGNIFICANT CHANGE UP
EOSINOPHIL # BLD AUTO: 0.13 K/UL — SIGNIFICANT CHANGE UP (ref 0–0.5)
EOSINOPHIL NFR BLD AUTO: 1.4 % — SIGNIFICANT CHANGE UP (ref 0–6)
FLUAV SUBTYP SPEC NAA+PROBE: SIGNIFICANT CHANGE UP
FLUBV RNA SPEC QL NAA+PROBE: SIGNIFICANT CHANGE UP
GAS PNL BLDV: 138 MMOL/L — SIGNIFICANT CHANGE UP (ref 136–145)
GLUCOSE BLDV-MCNC: 105 MG/DL — HIGH (ref 70–99)
GLUCOSE SERPL-MCNC: 121 MG/DL — HIGH (ref 70–99)
HADV DNA SPEC QL NAA+PROBE: SIGNIFICANT CHANGE UP
HCG SERPL-ACNC: <5 MIU/ML — SIGNIFICANT CHANGE UP
HCO3 BLDV-SCNC: 27 MMOL/L — SIGNIFICANT CHANGE UP (ref 22–29)
HCOV 229E RNA SPEC QL NAA+PROBE: SIGNIFICANT CHANGE UP
HCOV HKU1 RNA SPEC QL NAA+PROBE: SIGNIFICANT CHANGE UP
HCOV NL63 RNA SPEC QL NAA+PROBE: SIGNIFICANT CHANGE UP
HCOV OC43 RNA SPEC QL NAA+PROBE: DETECTED
HCT VFR BLD CALC: 43.5 % — SIGNIFICANT CHANGE UP (ref 34.5–45)
HCT VFR BLDA CALC: 42 % — SIGNIFICANT CHANGE UP (ref 34.5–46.5)
HGB BLD CALC-MCNC: 14.1 G/DL — SIGNIFICANT CHANGE UP (ref 11.5–15.5)
HGB BLD-MCNC: 14.4 G/DL — SIGNIFICANT CHANGE UP (ref 11.5–15.5)
HMPV RNA SPEC QL NAA+PROBE: SIGNIFICANT CHANGE UP
HPIV1 RNA SPEC QL NAA+PROBE: SIGNIFICANT CHANGE UP
HPIV2 RNA SPEC QL NAA+PROBE: SIGNIFICANT CHANGE UP
HPIV3 RNA SPEC QL NAA+PROBE: SIGNIFICANT CHANGE UP
HPIV4 RNA SPEC QL NAA+PROBE: SIGNIFICANT CHANGE UP
IANC: 6.85 K/UL — SIGNIFICANT CHANGE UP (ref 1.8–7.4)
IMM GRANULOCYTES NFR BLD AUTO: 0.4 % — SIGNIFICANT CHANGE UP (ref 0–0.9)
INR BLD: 1.11 RATIO — SIGNIFICANT CHANGE UP (ref 0.88–1.16)
LACTATE BLDV-MCNC: 0.8 MMOL/L — SIGNIFICANT CHANGE UP (ref 0.5–2)
LIDOCAIN IGE QN: 55 U/L — SIGNIFICANT CHANGE UP (ref 7–60)
LYMPHOCYTES # BLD AUTO: 1.65 K/UL — SIGNIFICANT CHANGE UP (ref 1–3.3)
LYMPHOCYTES # BLD AUTO: 17.2 % — SIGNIFICANT CHANGE UP (ref 13–44)
M PNEUMO DNA SPEC QL NAA+PROBE: SIGNIFICANT CHANGE UP
MAGNESIUM SERPL-MCNC: 2.2 MG/DL — SIGNIFICANT CHANGE UP (ref 1.6–2.6)
MCHC RBC-ENTMCNC: 31 PG — SIGNIFICANT CHANGE UP (ref 27–34)
MCHC RBC-ENTMCNC: 33.1 GM/DL — SIGNIFICANT CHANGE UP (ref 32–36)
MCV RBC AUTO: 93.8 FL — SIGNIFICANT CHANGE UP (ref 80–100)
MONOCYTES # BLD AUTO: 0.89 K/UL — SIGNIFICANT CHANGE UP (ref 0–0.9)
MONOCYTES NFR BLD AUTO: 9.3 % — SIGNIFICANT CHANGE UP (ref 2–14)
NEUTROPHILS # BLD AUTO: 6.85 K/UL — SIGNIFICANT CHANGE UP (ref 1.8–7.4)
NEUTROPHILS NFR BLD AUTO: 71.4 % — SIGNIFICANT CHANGE UP (ref 43–77)
NRBC # BLD: 0 /100 WBCS — SIGNIFICANT CHANGE UP (ref 0–0)
NRBC # FLD: 0 K/UL — SIGNIFICANT CHANGE UP (ref 0–0)
PCO2 BLDV: 44 MMHG — HIGH (ref 39–42)
PH BLDV: 7.39 — SIGNIFICANT CHANGE UP (ref 7.32–7.43)
PLATELET # BLD AUTO: 274 K/UL — SIGNIFICANT CHANGE UP (ref 150–400)
PO2 BLDV: 59 MMHG — SIGNIFICANT CHANGE UP
POTASSIUM BLDV-SCNC: 3.3 MMOL/L — LOW (ref 3.5–5.1)
POTASSIUM SERPL-MCNC: 4.1 MMOL/L — SIGNIFICANT CHANGE UP (ref 3.5–5.3)
POTASSIUM SERPL-SCNC: 4.1 MMOL/L — SIGNIFICANT CHANGE UP (ref 3.5–5.3)
PROT SERPL-MCNC: 7.5 G/DL — SIGNIFICANT CHANGE UP (ref 6–8.3)
PROTHROM AB SERPL-ACNC: 12.9 SEC — SIGNIFICANT CHANGE UP (ref 10.5–13.4)
RAPID RVP RESULT: DETECTED
RBC # BLD: 4.64 M/UL — SIGNIFICANT CHANGE UP (ref 3.8–5.2)
RBC # FLD: 12.6 % — SIGNIFICANT CHANGE UP (ref 10.3–14.5)
RH IG SCN BLD-IMP: POSITIVE — SIGNIFICANT CHANGE UP
RSV RNA SPEC QL NAA+PROBE: SIGNIFICANT CHANGE UP
RV+EV RNA SPEC QL NAA+PROBE: SIGNIFICANT CHANGE UP
SAO2 % BLDV: 88.4 % — SIGNIFICANT CHANGE UP
SARS-COV-2 RNA SPEC QL NAA+PROBE: SIGNIFICANT CHANGE UP
SODIUM SERPL-SCNC: 141 MMOL/L — SIGNIFICANT CHANGE UP (ref 135–145)
WBC # BLD: 9.59 K/UL — SIGNIFICANT CHANGE UP (ref 3.8–10.5)
WBC # FLD AUTO: 9.59 K/UL — SIGNIFICANT CHANGE UP (ref 3.8–10.5)

## 2023-01-07 PROCEDURE — 74177 CT ABD & PELVIS W/CONTRAST: CPT | Mod: 26,MA

## 2023-01-07 PROCEDURE — 99285 EMERGENCY DEPT VISIT HI MDM: CPT

## 2023-01-07 PROCEDURE — 71045 X-RAY EXAM CHEST 1 VIEW: CPT | Mod: 26

## 2023-01-07 RX ORDER — THIAMINE MONONITRATE (VIT B1) 100 MG
100 TABLET ORAL ONCE
Refills: 0 | Status: COMPLETED | OUTPATIENT
Start: 2023-01-07 | End: 2023-01-07

## 2023-01-07 RX ORDER — OMEPRAZOLE 10 MG/1
1 CAPSULE, DELAYED RELEASE ORAL
Qty: 30 | Refills: 0
Start: 2023-01-07 | End: 2023-02-05

## 2023-01-07 RX ORDER — LEVETIRACETAM 250 MG/1
1000 TABLET, FILM COATED ORAL ONCE
Refills: 0 | Status: COMPLETED | OUTPATIENT
Start: 2023-01-07 | End: 2023-01-07

## 2023-01-07 RX ORDER — SODIUM CHLORIDE 9 MG/ML
1000 INJECTION, SOLUTION INTRAVENOUS ONCE
Refills: 0 | Status: COMPLETED | OUTPATIENT
Start: 2023-01-07 | End: 2023-01-07

## 2023-01-07 RX ORDER — IOHEXOL 300 MG/ML
30 INJECTION, SOLUTION INTRAVENOUS ONCE
Refills: 0 | Status: COMPLETED | OUTPATIENT
Start: 2023-01-07 | End: 2023-01-07

## 2023-01-07 RX ORDER — ONDANSETRON 8 MG/1
1 TABLET, FILM COATED ORAL
Qty: 9 | Refills: 0
Start: 2023-01-07 | End: 2023-01-09

## 2023-01-07 RX ORDER — PANTOPRAZOLE SODIUM 20 MG/1
80 TABLET, DELAYED RELEASE ORAL ONCE
Refills: 0 | Status: COMPLETED | OUTPATIENT
Start: 2023-01-07 | End: 2023-01-07

## 2023-01-07 RX ORDER — ONDANSETRON 8 MG/1
4 TABLET, FILM COATED ORAL ONCE
Refills: 0 | Status: COMPLETED | OUTPATIENT
Start: 2023-01-07 | End: 2023-01-07

## 2023-01-07 RX ADMIN — LEVETIRACETAM 400 MILLIGRAM(S): 250 TABLET, FILM COATED ORAL at 07:54

## 2023-01-07 RX ADMIN — SODIUM CHLORIDE 1000 MILLILITER(S): 9 INJECTION, SOLUTION INTRAVENOUS at 11:53

## 2023-01-07 RX ADMIN — Medication 100 MILLIGRAM(S): at 11:53

## 2023-01-07 RX ADMIN — LEVETIRACETAM 1000 MILLIGRAM(S): 250 TABLET, FILM COATED ORAL at 08:09

## 2023-01-07 RX ADMIN — PANTOPRAZOLE SODIUM 80 MILLIGRAM(S): 20 TABLET, DELAYED RELEASE ORAL at 07:54

## 2023-01-07 RX ADMIN — IOHEXOL 30 MILLILITER(S): 300 INJECTION, SOLUTION INTRAVENOUS at 11:01

## 2023-01-07 RX ADMIN — ONDANSETRON 4 MILLIGRAM(S): 8 TABLET, FILM COATED ORAL at 07:54

## 2023-01-07 NOTE — ED PROVIDER NOTE - NSICDXFAMILYHX_GEN_ALL_CORE_FT
FAMILY HISTORY:  Father  Still living? Yes, Estimated age: Age Unknown  Family history of diabetes mellitus, Age at diagnosis: Age Unknown  Family history of hypercholesterolemia, Age at diagnosis: Age Unknown  Family history of hypertension, Age at diagnosis: Age Unknown  Family history of Parkinson's disease, Age at diagnosis: Age Unknown  Family history of stroke, Age at diagnosis: Age Unknown    Mother  Still living? Unknown  Family history of seizures, Age at diagnosis: Age Unknown    Aunt  Still living? Unknown  Family history of seizures, Age at diagnosis: Age Unknown

## 2023-01-07 NOTE — ED PROVIDER NOTE - ATTENDING CONTRIBUTION TO CARE
HPI: 41yo F w/ history of epilepsy (on keppra; last seizure 2012), muscular dystrophy, gastric bypass (2011) coming in w/ 2 days of epigastric abdominal pain associated w/ coffee ground emesis. Patient had a similar episode approximately 1 year ago at [place] and found to have a ruptured peptic ulcer. Denies any alcohol use. Has been unable to tolerate PO secondary to pain and nausea. Symptoms started shortly after being prescribed steroids of URI at urgent care.  EXAM: NAD, awake, alert, heart RRR, lungs ctab, chest wall nontender, abd soft nontender, no rebound/guarding.   MDM: pt with multiple medical problems that has history of PUD in past with perforation that HPI: 41yo F w/ history of epilepsy (on keppra; last seizure 2012), muscular dystrophy, gastric bypass (2011) coming in w/ 2 days of epigastric abdominal pain associated w/ coffee ground emesis. Patient had a similar episode approximately 1 year ago at [place] and found to have a ruptured peptic ulcer. Denies any alcohol use. Has been unable to tolerate PO secondary to pain and nausea. Symptoms started shortly after being prescribed steroids of URI at urgent care.  EXAM: NAD, awake, alert, heart RRR, lungs ctab, chest wall nontender, abd soft nontender, no rebound/guarding.   MDM: pt with multiple medical problems that has history of PUD in past with perforation that presents with abd pain and coffee ground emesis as well as dark stools likely also from GIB. Concern for GIB and hemorrhage although pt appears well now. Will require close monitoring but in meantime, will provide medications, IVF and obtain labs to check for anemia or requirement for transfusion. Will also consult surgery due to pt history of gastric bypass by Dr Marquez at [place] in 2011, concern for perforation but has no current peritoneal abd on exam.

## 2023-01-07 NOTE — ED ADULT TRIAGE NOTE - CHIEF COMPLAINT QUOTE
Pt c/o multiple episodes of coffee ground emesis 2x days, diarrhea, mild epigastric pain. Unable to tolerate anything PO due to nausea. Denies blood thinner use. PMH epilepsy, muscular dystrophy

## 2023-01-07 NOTE — ED ADULT NURSE NOTE - OBJECTIVE STATEMENT
Patient came in with the complaints of nausea, mid epigastric pain with coffee ground emesis. As  per patient she is not able to hold anything in stomach from 3 days. No other complaints. Specimens collected and sent. Medications given as ordered. Patient tolerated well. Nursing care continues

## 2023-01-07 NOTE — ED PROVIDER NOTE - NSICDXPASTMEDICALHX_GEN_ALL_CORE_FT
PAST MEDICAL HISTORY:  Arthralgia of temporomandibular joint     Benign breast cyst in female     Bradycardia pt currently taking propranolol for chronic headaches    Cervical dysplasia s/p ablation    Chronic headaches treated with propanolol    EP (epilepsy) last seizure in 2012

## 2023-01-07 NOTE — ED PROVIDER NOTE - CLINICAL SUMMARY MEDICAL DECISION MAKING FREE TEXT BOX
41yo F w/ history of epilepsy (on keppra; last seizure 2012), muscular dystrophy, gastric bypass (2011) and ruptured peptic ulcer coming in w/ 2 days of epigastric abdominal pain associated w/ coffee ground emesis in the setting of beginning steroids; concerns for upper GIB at this time; will assess need for blood transfusion, provide patient w/ home antepileptic medication, symptom relief and assess for SBO w/ CTAP. Will require surgical consult given history of gastric bypass

## 2023-01-07 NOTE — ED PROVIDER NOTE - NSFOLLOWUPINSTRUCTIONS_ED_ALL_ED_FT
Discharge instructions:    - Please follow up with your Primary Care Doctor within the next 24-72 hours.    - We recommend you follow up with your Bariatric Surgeon.    - Take any prescribed medications as instructed:       SEEK IMMEDIATE MEDICAL CARE IF YOU HAVE ANY OF THE FOLLOWING SYMPTOMS: fever, inability to keep sufficient fluids down, black or bloody vomitus, black or bloody stools, lightheadedness/dizziness, chest pain, severe headache, rash, shortness of breath, cold or clammy skin, confusion, pain with urination, or any signs of dehydration.

## 2023-01-07 NOTE — ED PROVIDER NOTE - PATIENT PORTAL LINK FT
You can access the FollowMyHealth Patient Portal offered by [facility] by registering at the following website: [email or web address]. By joining [employer]’s FollowMyHealth portal, you will also be able to view your health information using other applications (apps) compatible with our system.

## 2023-01-07 NOTE — CONSULT NOTE ADULT - ASSESSMENT
ASSESSMENT: 43yo F w/ history of epilepsy (on keppra; last seizure 2012), muscular dystrophy, RNY gastric bypass (2011), and Everton patch repair of a perforated gastric ulcer (03/2022) coming in w/ 2 days of epigastric abdominal pain associated w/ coffee ground emesis, after starting steroids for congested symptoms. Patient unable to tolerate any po intake and has had several episodes of diarrhea.     PLAN:    - Recommend IV hydration with IV LR  -   -   -   - Patient seen/examined with attending.  - Plan to be discussed with Attending,   ASSESSMENT: 41yo F w/ history of epilepsy (on keppra; last seizure 2012), muscular dystrophy, RNY gastric bypass (2011), and Everton patch repair of a perforated gastric ulcer (03/2022) coming in w/ 2 days of epigastric abdominal pain associated w/ coffee ground emesis, after starting steroids for congested symptoms. Patient unable to tolerate any po intake and has had several episodes of diarrhea.     PLAN:    - Recommend IV hydration with LR, IV Thiamine, and IV Multivitamin   - f/u CT abdomen and pelvis with IV and PO contrast   -   -   - Plan to be discussed with Attending, Dr. YANES Team Surgery  [id number] ASSESSMENT: 43yo F w/ history of epilepsy (on keppra; last seizure 2012), muscular dystrophy, RNY gastric bypass (2011), and Everton patch repair of a perforated gastric ulcer (03/2022) coming in w/ 2 days of epigastric abdominal pain associated w/ coffee ground emesis, after starting steroids for congested symptoms. Patient unable to tolerate any po intake and has had several episodes of diarrhea.     PLAN:    - no acute surgical intervention indicated   - Recommend IV hydration with LR, IV Thiamine, and IV Multivitamin   - CT abdomen and pelvis with IV and PO contrast unremarkable with no signs of obstruction  - likely consistent with viral gastroenteritis   - po challenge and outpatient follow up with her bariatric surgeon     - Plan discussed with Attending, Dr. Sheridan YANES Team Surgery  [id number]

## 2023-01-07 NOTE — ED PROVIDER NOTE - PHYSICAL EXAMINATION
GENERAL: well appearing in no acute distress, non-toxic appearing  HEAD: normocephalic, atraumatic  HENT: airway intact, oral mucosa dry  EYES: normal conjunctiva  CARDIAC: regular rate and rhythm, normal S1S2, no appreciable murmurs, 2+ pulses in UE/LE b/l  PULM: normal breath sounds, clear to ascultation bilaterally, no rales, rhonchi, wheezing  GI: abdomen nondistended, soft, minor epigastric TTP; no guarding, rebound tenderness  NEURO: no focal motor or sensory deficits  MSK: no peripheral edema  SKIN: well-perfused, extremities warm, no visible rashes

## 2023-01-07 NOTE — CONSULT NOTE ADULT - SUBJECTIVE AND OBJECTIVE BOX
seen and examined. full note to follow pending workup BARIATRIC SURGERY CONSULT NOTE  --------------------------------------------------------------------------------------------      HPI:  43yo F w/ history of epilepsy (on keppra; last seizure 2012), muscular dystrophy, gastric bypass (2011), and Everton patch repair of a perforated gastric ulcer (03/2022) coming in w/ 2 days of epigastric abdominal pain associated w/ coffee ground emesis. Patient had a similar episode approximately 1 year ago at [place] and found to have a ruptured peptic ulcer. Denies any alcohol use. Has been unable to tolerate PO secondary to pain and nausea. Symptoms started shortly after being prescribed steroids of URI at urgent care. Denies fevers or chills. Currently reports no pain but unable to tolerate any po intake for the past 2-3 days, with minimal dark emesis. Reports several episodes of diarrhea.     Relevant Surgical history:  Cordelia en Y gastric bypass by Dr. Jaxson Marquez at [place] (2011)  Laparoscopic cholecystectomy by Dr. Canseco at [facility] (2014)  Diagnostic laparoscopy, extensive Hazel, Everton patch repair of a perforated gastric ulcer at [facility] (03/2022)       ROS: 10-system review is otherwise negative except HPI above.      PAST MEDICAL & SURGICAL HISTORY:  Arthralgia of temporomandibular joint      EP (epilepsy)  last seizure in 2012      Chronic headaches  treated with propanolol      Benign breast cyst in female      Cervical dysplasia  s/p ablation      Bradycardia  pt currently taking propranolol for chronic headaches      gastric bypass  2011 lost 100 pounds      Status post VNS (vagus nerve stimulator) placement  implanted, and removed 2006      craniotomy  3/2006 placement of EEG leads      craniotomy- removal right temporal lobe  4/2006      Breast lump in female  left breast lumpectomy      Breast calcification, right  breast biopsy      History of TMJ disorder  Arthroscopic surgery right side      S/P cholecystectomy  3/2014      H/O bilateral mastectomy      S/P breast reconstruction, bilateral      Cataract  b/l  left 12/19/2016 &amp; right 1/30/2017 with b/l IOL implanted        FAMILY HISTORY:  Family history of seizures (Mother, Aunt)    Family history of hypertension (Father)    Family history of stroke (Father)    Family history of Parkinson&#x27;s disease (Father)    Family history of diabetes mellitus (Father)    Family history of hypercholesterolemia (Father)    [] Family history not pertinent as reviewed with the patient and family    SOCIAL HISTORY:  n/a    ALLERGIES: ibuprofen (Pruritus; Rash)  lamictal - Rash (Rash)  penicillin (Other)  Yeast - Oral thrush and vaginal candidiasis (Other)      HOME MEDICATIONS:   Keppra XR 500mg qHS  Propranolol 5mg BID   Baclofen 10mg BID  Ropinirole 0.25mg BID  Dexilant 30mg qAM  Metoclopramide 10mg qd prn      CURRENT MEDICATIONS  MEDICATIONS (STANDING): iohexol 300 mG (iodine)/mL Oral Solution 30 milliLiter(s) Oral once    MEDICATIONS (PRN):  --------------------------------------------------------------------------------------------    Vitals:   T(C): 36.7 (01-07-23 @ 06:18), Max: 36.7 (01-07-23 @ 06:18)  HR: 84 (01-07-23 @ 06:18) (84 - 84)  BP: 118/90 (01-07-23 @ 06:18) (118/90 - 118/90)  RR: 18 (01-07-23 @ 06:18) (18 - 18)  SpO2: 100% (01-07-23 @ 06:18) (100% - 100%)  CAPILLARY BLOOD GLUCOSE              PHYSICAL EXAM:   General: NAD, Lying in bed comfortably  Neuro: A+Ox3  Resp: Good effort, CTA b/l  GI/Abd: Soft, NT/ND, no rebound/guarding, no masses palpated. prior lap incisions well healed.   Vascular: All 4 extremities warm.  Skin: Intact, no breakdown  Musculoskeletal: All 4 extremities moving spontaneously, no limitations  --------------------------------------------------------------------------------------------    LABS  CBC (01-07 @ 08:00)                              14.4                           9.59    )----------------(  274        71.4  % Neutrophils, 17.2  % Lymphocytes, ANC: 6.85                                43.5      BMP (01-07 @ 08:00)             141     |  105     |  12    		Ca++ --      Ca 9.4                ---------------------------------( 121<H>		Mg 2.20               4.1     |  24      |  0.76  			Ph --        LFTs (01-07 @ 08:00)      TPro 7.5 / Alb 4.5 / TBili 0.4 / DBili -- / AST 19 / ALT 8 / AlkPhos 90    Coags (01-07 @ 08:00)  aPTT 27.4 / INR 1.11 / PT 12.9        VBG (01-07 @ 08:44)     7.39 / 44<H> / 59 / 27 / 1.2 / 88.4%     Lactate: 0.8    --------------------------------------------------------------------------------------------    MICROBIOLOGY      --------------------------------------------------------------------------------------------    IMAGING  ***    --------------------------------------------------------------------------------------------     BARIATRIC SURGERY CONSULT NOTE  --------------------------------------------------------------------------------------------      HPI:  43yo F w/ history of epilepsy (on keppra; last seizure 2012), muscular dystrophy, gastric bypass (2011), and Everton patch repair of a perforated gastric ulcer (03/2022) coming in w/ 2 days of epigastric abdominal pain associated w/ coffee ground emesis. Patient had a similar episode approximately 1 year ago at [place] and found to have a ruptured peptic ulcer. Denies any alcohol use. Has been unable to tolerate PO secondary to pain and nausea. Symptoms started shortly after being prescribed steroids of URI at urgent care. Denies fevers or chills. Currently reports no pain but unable to tolerate any po intake for the past 2-3 days, with minimal dark emesis. Reports several episodes of diarrhea.     Relevant Surgical history:  Cordelia en Y gastric bypass by Dr. Jaxson Marquez at [place] (2011)  Laparoscopic cholecystectomy by Dr. Canseco at [facility] (2014)  Diagnostic laparoscopy, extensive Hazel, Everton patch repair of a perforated gastric ulcer at [facility] (03/2022)       ROS: 10-system review is otherwise negative except HPI above.      PAST MEDICAL & SURGICAL HISTORY:  Arthralgia of temporomandibular joint      EP (epilepsy)  last seizure in 2012      Chronic headaches  treated with propanolol      Benign breast cyst in female      Cervical dysplasia  s/p ablation      Bradycardia  pt currently taking propranolol for chronic headaches      gastric bypass  2011 lost 100 pounds      Status post VNS (vagus nerve stimulator) placement  implanted, and removed 2006      craniotomy  3/2006 placement of EEG leads      craniotomy- removal right temporal lobe  4/2006      Breast lump in female  left breast lumpectomy      Breast calcification, right  breast biopsy      History of TMJ disorder  Arthroscopic surgery right side      S/P cholecystectomy  3/2014      H/O bilateral mastectomy      S/P breast reconstruction, bilateral      Cataract  b/l  left 12/19/2016 &amp; right 1/30/2017 with b/l IOL implanted        FAMILY HISTORY:  Family history of seizures (Mother, Aunt)    Family history of hypertension (Father)    Family history of stroke (Father)    Family history of Parkinson&#x27;s disease (Father)    Family history of diabetes mellitus (Father)    Family history of hypercholesterolemia (Father)    [] Family history not pertinent as reviewed with the patient and family    SOCIAL HISTORY:  n/a    ALLERGIES: ibuprofen (Pruritus; Rash)  lamictal - Rash (Rash)  penicillin (Other)  Yeast - Oral thrush and vaginal candidiasis (Other)      HOME MEDICATIONS:   Keppra XR 500mg qHS  Propranolol 5mg BID   Baclofen 10mg BID  Ropinirole 0.25mg BID  Dexilant 30mg qAM  Metoclopramide 10mg qd prn      CURRENT MEDICATIONS  MEDICATIONS (STANDING): iohexol 300 mG (iodine)/mL Oral Solution 30 milliLiter(s) Oral once    MEDICATIONS (PRN):  --------------------------------------------------------------------------------------------    Vitals:   T(C): 36.7 (01-07-23 @ 06:18), Max: 36.7 (01-07-23 @ 06:18)  HR: 84 (01-07-23 @ 06:18) (84 - 84)  BP: 118/90 (01-07-23 @ 06:18) (118/90 - 118/90)  RR: 18 (01-07-23 @ 06:18) (18 - 18)  SpO2: 100% (01-07-23 @ 06:18) (100% - 100%)  CAPILLARY BLOOD GLUCOSE              PHYSICAL EXAM:   General: NAD, Lying in bed comfortably  Neuro: A+Ox3  Resp: Good effort, CTA b/l  GI/Abd: Soft, NT/ND, no rebound/guarding, no masses palpated. prior lap incisions well healed.   Vascular: All 4 extremities warm.  Skin: Intact, no breakdown  Musculoskeletal: All 4 extremities moving spontaneously, no limitations  --------------------------------------------------------------------------------------------    LABS  CBC (01-07 @ 08:00)                              14.4                           9.59    )----------------(  274        71.4  % Neutrophils, 17.2  % Lymphocytes, ANC: 6.85                                43.5      BMP (01-07 @ 08:00)             141     |  105     |  12    		Ca++ --      Ca 9.4                ---------------------------------( 121<H>		Mg 2.20               4.1     |  24      |  0.76  			Ph --        LFTs (01-07 @ 08:00)      TPro 7.5 / Alb 4.5 / TBili 0.4 / DBili -- / AST 19 / ALT 8 / AlkPhos 90    Coags (01-07 @ 08:00)  aPTT 27.4 / INR 1.11 / PT 12.9        VBG (01-07 @ 08:44)     7.39 / 44<H> / 59 / 27 / 1.2 / 88.4%     Lactate: 0.8    --------------------------------------------------------------------------------------------    MICROBIOLOGY      --------------------------------------------------------------------------------------------    IMAGING  < from: CT Abdomen and Pelvis w/ Oral Cont and w/ IV Cont (01.07.23 @ 12:48) >  FINDINGS:  LOWER CHEST: Partially imaged bilateral breast implants.    LIVER: Within normal limits.  BILE DUCTS: Normal caliber.  GALLBLADDER: Cholecystectomy.  SPLEEN: Within normal limits.  PANCREAS: Within normal limits.  ADRENALS: Within normal limits.  KIDNEYS/URETERS: Symmetric enhancement bilaterally. No hydronephrosis.    BLADDER: Within normal limits.  REPRODUCTIVE ORGANS: IUD in good position. Adnexa within normal limits.    BOWEL: Status post gastric band is passed. Excluded stomach is   nondilated. No evidence of internal hernia. No bowel obstruction.   Appendix normal. Mild thickening of the right colon consistent with   colitis.  PERITONEUM: No ascites.  VESSELS: Within normal limits.  RETROPERITONEUM/LYMPH NODES: No lymphadenopathy.  ABDOMINAL WALL: Within normal limits.  BONES: Degenerative changes.    IMPRESSION:  Mild right-sided colitis.        --- End of Report ---          LIZBETH HASTINGS MD; Resident Radiologist  This document has been electronically signed.  MAME WHITLOCK MD; Attending Radiologist  This document has been electronically signed. Jan 7 2023  1:01PM    < end of copied text >      --------------------------------------------------------------------------------------------

## 2023-01-07 NOTE — ED ADULT NURSE REASSESSMENT NOTE - NS ED NURSE REASSESS COMMENT FT1
Received patient in room 2, waiting to go to CT scan, pending lab results. Patient denies SOB, chest pain. No seizure activity noted at this time. Patient is A&OX4, airway patent, breathing unlabored and even. Side rails up and safety maintained. Fall precaution in place.

## 2023-01-07 NOTE — ED PROVIDER NOTE - NSICDXPASTSURGICALHX_GEN_ALL_CORE_FT
PAST SURGICAL HISTORY:  Breast calcification, right breast biopsy    Breast lump in female left breast lumpectomy    Cataract b/l  left 12/19/2016 & right 1/30/2017 with b/l IOL implanted    craniotomy 3/2006 placement of EEG leads    craniotomy- removal right temporal lobe 4/2006    gastric bypass 2011 lost 100 pounds    H/O bilateral mastectomy     History of TMJ disorder Arthroscopic surgery right side    S/P breast reconstruction, bilateral     S/P cholecystectomy 3/2014    Status post VNS (vagus nerve stimulator) placement implanted, and removed 2006

## 2023-01-07 NOTE — ED PROVIDER NOTE - NS ED ROS FT
General: denies fever, chills  HENT: denies nasal congestion, rhinorrhea  Eyes: denies visual changes, blurred vision  CV: denies chest pain, palpitations  Resp: denies difficulty breathing, cough  Abdominal: + nausea, vomiting, and abdominal pain  : denies urinary pain or discharge  MSK: denies muscle aches, leg swelling  Neuro: denies headaches, numbness, tingling  Skin: denies rashes, bruises

## 2023-01-07 NOTE — ED PROVIDER NOTE - OBJECTIVE STATEMENT
43yo F w/ history of epilepsy (on keppra; last seizure 2012), muscular dystrophy, gastric bypass (2011) coming in w/ 2 days of epigastric abdominal pain associated w/ coffee ground emesis. Patient had a similar episode approximately 1 year ago at [place] and found to have a ruptured peptic ulcer. Denies any alcohol use. Has been unable to tolerate PO secondary to pain and nausea. Symptoms started shortly after being prescribed steroids of URI at urgent care.

## 2023-01-07 NOTE — ED PROVIDER NOTE - PROGRESS NOTE DETAILS
Yaron, PGY3: surgery aware and will evaluate Yaron, PG3: patient reassessed and noting symptomatic relief. Able to tolerate PO contrast + PO meds w/o difficulty. CT notable only for mild colitis. Awaiting surgery recs but anticipate dc Yaron, PGY3:  patient reassessed and continues to be asymptomatic; able to tolerate PO. Patient cleared by surgery. Patient to follow up with outpatient bariatric surgeon. Strict return precautions provided and patient understands and agrees w/ plan

## 2024-09-21 NOTE — ASU DISCHARGE PLAN (ADULT/PEDIATRIC). - ASU FOLLOWUP
lying in bed    Vital Signs Last 24 Hrs  T(C): 36.8 (20 Sep 2020 04:58), Max: 37.3 (19 Sep 2020 23:41)  T(F): 98.2 (20 Sep 2020 04:58), Max: 99.2 (19 Sep 2020 23:41)  HR: 78 (20 Sep 2020 04:58) (73 - 91)  BP: 122/67 (20 Sep 2020 04:58) (122/67 - 167/80)  BP(mean): --  RR: 18 (20 Sep 2020 04:58) (16 - 18)  SpO2: 97% (20 Sep 2020 04:58) (97% - 98%)    PHYSICAL EXAM:    general - AAO x 3  HEENT - No Icterus  CVS - RRR  RS - AE B/L  Abd - soft, NT  Ext - Pulses +        LABS:                        8.9    4.37  )-----------( 205      ( 20 Sep 2020 07:52 )             26.9     09-20    140  |  104  |  4<L>  ----------------------------<  113<H>  3.5   |  31  |  0.62    Ca    8.7      20 Sep 2020 07:52  Phos  3.2     09-20  Mg     2.1     09-20              RADIOLOGY & ADDITIONAL STUDIES:  
show
911 or go to the nearest Emergency Room

## 2024-12-28 ENCOUNTER — NON-APPOINTMENT (OUTPATIENT)
Age: 44
End: 2024-12-28
